# Patient Record
Sex: FEMALE | Race: WHITE | NOT HISPANIC OR LATINO | Employment: OTHER | URBAN - METROPOLITAN AREA
[De-identification: names, ages, dates, MRNs, and addresses within clinical notes are randomized per-mention and may not be internally consistent; named-entity substitution may affect disease eponyms.]

---

## 2023-03-30 LAB
ALANINE AMINOTRANSFERASE (SGPT) (U/L) IN SER/PLAS: 62 U/L (ref 7–45)
ALBUMIN (G/DL) IN SER/PLAS: 4.4 G/DL (ref 3.4–5)
ALKALINE PHOSPHATASE (U/L) IN SER/PLAS: 153 U/L (ref 33–136)
ANION GAP IN SER/PLAS: 12 MMOL/L (ref 10–20)
ASPARTATE AMINOTRANSFERASE (SGOT) (U/L) IN SER/PLAS: 81 U/L (ref 9–39)
BILIRUBIN TOTAL (MG/DL) IN SER/PLAS: 0.7 MG/DL (ref 0–1.2)
CALCIUM (MG/DL) IN SER/PLAS: 9.4 MG/DL (ref 8.6–10.6)
CARBON DIOXIDE, TOTAL (MMOL/L) IN SER/PLAS: 27 MMOL/L (ref 21–32)
CHLORIDE (MMOL/L) IN SER/PLAS: 104 MMOL/L (ref 98–107)
CREATININE (MG/DL) IN SER/PLAS: 0.75 MG/DL (ref 0.5–1.05)
FERRITIN (UG/LL) IN SER/PLAS: 475 UG/L (ref 8–150)
GFR FEMALE: 88 ML/MIN/1.73M2
GLUCOSE (MG/DL) IN SER/PLAS: 92 MG/DL (ref 74–99)
HEPATITIS A VIRUS IGM AB PRESENCE IN SER/PLAS BY IMMUNOASSAY: NONREACTIVE
HEPATITIS B VIRUS CORE IGM AB PRESENCE IN SER/PLAS BY IMMUNOASSY: NONREACTIVE
HEPATITIS B VIRUS SURFACE AG PRESENCE IN SERUM: NONREACTIVE
HEPATITIS C VIRUS AB PRESENCE IN SERUM: NONREACTIVE
POTASSIUM (MMOL/L) IN SER/PLAS: 4.3 MMOL/L (ref 3.5–5.3)
PROTEIN TOTAL: 7.1 G/DL (ref 6.4–8.2)
SODIUM (MMOL/L) IN SER/PLAS: 139 MMOL/L (ref 136–145)
UREA NITROGEN (MG/DL) IN SER/PLAS: 13 MG/DL (ref 6–23)

## 2023-03-31 LAB — ANTI-NUCLEAR ANTIBODY (ANA): NEGATIVE

## 2023-04-03 DIAGNOSIS — R79.89 ABNORMAL LFTS (LIVER FUNCTION TESTS): Primary | ICD-10-CM

## 2023-04-03 LAB
ALKALINE PHOSPHATASE (REF): 171 U/L (ref 40–120)
ALKALINE PHOSPHATASE OTHER: 0 U/L
ALKALINE PHOSPHATASE.BONE (U/L) IN SERUM OR PLASMA: 46 U/L (ref 0–55)
ALKALINE PHOSPHATASE.LIVER (U/L) IN SERUM OR PLASMA: 125 U/L (ref 0–94)

## 2023-04-04 DIAGNOSIS — K21.9 GERD WITHOUT ESOPHAGITIS: Primary | ICD-10-CM

## 2023-04-04 RX ORDER — OMEPRAZOLE 40 MG/1
CAPSULE, DELAYED RELEASE ORAL
Qty: 90 CAPSULE | Refills: 0 | Status: SHIPPED | OUTPATIENT
Start: 2023-04-04 | End: 2023-05-30 | Stop reason: SDUPTHER

## 2023-05-23 DIAGNOSIS — I10 HTN (HYPERTENSION), BENIGN: Primary | ICD-10-CM

## 2023-05-23 RX ORDER — LISINOPRIL 40 MG/1
TABLET ORAL
Qty: 90 TABLET | Refills: 0 | Status: SHIPPED | OUTPATIENT
Start: 2023-05-23 | End: 2023-05-30 | Stop reason: SDUPTHER

## 2023-05-24 LAB
ALANINE AMINOTRANSFERASE (SGPT) (U/L) IN SER/PLAS: 67 U/L (ref 7–45)
ALBUMIN (G/DL) IN SER/PLAS: 3.9 G/DL (ref 3.4–5)
ALBUMIN (G/DL) IN SER/PLAS: 3.9 G/DL (ref 3.4–5)
ALKALINE PHOSPHATASE (U/L) IN SER/PLAS: 136 U/L (ref 33–136)
ANION GAP IN SER/PLAS: 14 MMOL/L (ref 10–20)
ASPARTATE AMINOTRANSFERASE (SGOT) (U/L) IN SER/PLAS: 55 U/L (ref 9–39)
BILIRUBIN DIRECT (MG/DL) IN SER/PLAS: 0.1 MG/DL (ref 0–0.3)
BILIRUBIN TOTAL (MG/DL) IN SER/PLAS: 0.5 MG/DL (ref 0–1.2)
CALCIUM (MG/DL) IN SER/PLAS: 9.2 MG/DL (ref 8.6–10.6)
CARBON DIOXIDE, TOTAL (MMOL/L) IN SER/PLAS: 23 MMOL/L (ref 21–32)
CERULOPLASMIN (MG/DL) IN SER/PLAS: 32 MG/DL (ref 20–60)
CHLORIDE (MMOL/L) IN SER/PLAS: 106 MMOL/L (ref 98–107)
CREATININE (MG/DL) IN SER/PLAS: 0.76 MG/DL (ref 0.5–1.05)
GFR FEMALE: 86 ML/MIN/1.73M2
GLUCOSE (MG/DL) IN SER/PLAS: 92 MG/DL (ref 74–99)
IRON (UG/DL) IN SER/PLAS: 131 UG/DL (ref 35–150)
IRON BINDING CAPACITY (UG/DL) IN SER/PLAS: 333 UG/DL (ref 240–445)
IRON SATURATION (%) IN SER/PLAS: 39 % (ref 25–45)
PHOSPHATE (MG/DL) IN SER/PLAS: 3.2 MG/DL (ref 2.5–4.9)
POTASSIUM (MMOL/L) IN SER/PLAS: 4.3 MMOL/L (ref 3.5–5.3)
PROTEIN TOTAL: 7.1 G/DL (ref 6.4–8.2)
SODIUM (MMOL/L) IN SER/PLAS: 139 MMOL/L (ref 136–145)
UREA NITROGEN (MG/DL) IN SER/PLAS: 18 MG/DL (ref 6–23)

## 2023-05-25 LAB — MITOCHONDRIAL ANTIBODY: NEGATIVE

## 2023-05-26 PROBLEM — K57.92 ACUTE DIVERTICULITIS OF INTESTINE: Status: ACTIVE | Noted: 2023-05-26

## 2023-05-26 PROBLEM — M25.511 CHRONIC RIGHT SHOULDER PAIN: Status: ACTIVE | Noted: 2023-05-26

## 2023-05-26 PROBLEM — L20.89 FLEXURAL ATOPIC DERMATITIS: Status: ACTIVE | Noted: 2023-05-26

## 2023-05-26 PROBLEM — D36.9 ADENOMATOUS POLYPS: Status: ACTIVE | Noted: 2023-05-26

## 2023-05-26 PROBLEM — I10 BENIGN HYPERTENSION: Status: ACTIVE | Noted: 2023-05-26

## 2023-05-26 PROBLEM — Z11.52 ENCOUNTER FOR SCREENING LABORATORY TESTING FOR COVID-19 VIRUS: Status: ACTIVE | Noted: 2023-05-26

## 2023-05-26 PROBLEM — R09.89 RESPIRATORY SYMPTOMS: Status: ACTIVE | Noted: 2023-05-26

## 2023-05-26 PROBLEM — K76.0 STEATOSIS OF LIVER: Status: ACTIVE | Noted: 2023-05-26

## 2023-05-26 PROBLEM — M25.611 DECREASED RANGE OF MOTION OF RIGHT SHOULDER: Status: ACTIVE | Noted: 2023-05-26

## 2023-05-26 PROBLEM — J02.9 SORE THROAT: Status: ACTIVE | Noted: 2023-05-26

## 2023-05-26 PROBLEM — E66.9 OBESITY (BMI 30-39.9): Status: ACTIVE | Noted: 2023-05-26

## 2023-05-26 PROBLEM — J06.9 VIRAL URI: Status: ACTIVE | Noted: 2023-05-26

## 2023-05-26 PROBLEM — I51.9 DIASTOLIC DYSFUNCTION, LEFT VENTRICLE: Status: ACTIVE | Noted: 2023-05-26

## 2023-05-26 PROBLEM — K83.1 STRICTURE OF BILE DUCT (CMS-HCC): Status: ACTIVE | Noted: 2023-05-26

## 2023-05-26 PROBLEM — K57.32 DIVERTICULITIS OF COLON: Status: ACTIVE | Noted: 2023-05-26

## 2023-05-26 PROBLEM — J01.90 ACUTE SINUSITIS: Status: ACTIVE | Noted: 2023-05-26

## 2023-05-26 PROBLEM — D12.6 SERRATED ADENOMA OF COLON: Status: ACTIVE | Noted: 2023-05-26

## 2023-05-26 PROBLEM — R10.9 ACUTE ABDOMINAL PAIN: Status: ACTIVE | Noted: 2023-05-26

## 2023-05-26 PROBLEM — K21.9 GERD (GASTROESOPHAGEAL REFLUX DISEASE): Status: ACTIVE | Noted: 2023-05-26

## 2023-05-26 PROBLEM — L40.9 PSORIASIS: Status: ACTIVE | Noted: 2023-05-26

## 2023-05-26 PROBLEM — R79.89 ELEVATED LFTS: Status: ACTIVE | Noted: 2023-05-26

## 2023-05-26 PROBLEM — G89.29 CHRONIC RIGHT SHOULDER PAIN: Status: ACTIVE | Noted: 2023-05-26

## 2023-05-30 ENCOUNTER — OFFICE VISIT (OUTPATIENT)
Dept: PRIMARY CARE | Facility: CLINIC | Age: 68
End: 2023-05-30
Payer: MEDICARE

## 2023-05-30 VITALS
BODY MASS INDEX: 32.27 KG/M2 | SYSTOLIC BLOOD PRESSURE: 118 MMHG | HEIGHT: 64 IN | HEART RATE: 90 BPM | TEMPERATURE: 97.9 F | RESPIRATION RATE: 16 BRPM | OXYGEN SATURATION: 96 % | WEIGHT: 189 LBS | DIASTOLIC BLOOD PRESSURE: 82 MMHG

## 2023-05-30 DIAGNOSIS — E66.09 CLASS 1 OBESITY DUE TO EXCESS CALORIES WITHOUT SERIOUS COMORBIDITY WITH BODY MASS INDEX (BMI) OF 32.0 TO 32.9 IN ADULT: ICD-10-CM

## 2023-05-30 DIAGNOSIS — K21.9 GASTROESOPHAGEAL REFLUX DISEASE WITHOUT ESOPHAGITIS: ICD-10-CM

## 2023-05-30 DIAGNOSIS — K21.9 GERD WITHOUT ESOPHAGITIS: ICD-10-CM

## 2023-05-30 DIAGNOSIS — I10 BENIGN HYPERTENSION: Primary | ICD-10-CM

## 2023-05-30 DIAGNOSIS — R79.89 ELEVATED LFTS: ICD-10-CM

## 2023-05-30 DIAGNOSIS — I10 HTN (HYPERTENSION), BENIGN: ICD-10-CM

## 2023-05-30 PROBLEM — J06.9 VIRAL URI: Status: RESOLVED | Noted: 2023-05-26 | Resolved: 2023-05-30

## 2023-05-30 PROBLEM — E66.811 CLASS 1 OBESITY DUE TO EXCESS CALORIES WITHOUT SERIOUS COMORBIDITY WITH BODY MASS INDEX (BMI) OF 32.0 TO 32.9 IN ADULT: Status: ACTIVE | Noted: 2023-05-30

## 2023-05-30 PROBLEM — J02.9 SORE THROAT: Status: RESOLVED | Noted: 2023-05-26 | Resolved: 2023-05-30

## 2023-05-30 PROBLEM — R10.9 ACUTE ABDOMINAL PAIN: Status: RESOLVED | Noted: 2023-05-26 | Resolved: 2023-05-30

## 2023-05-30 PROBLEM — Z11.52 ENCOUNTER FOR SCREENING LABORATORY TESTING FOR COVID-19 VIRUS: Status: RESOLVED | Noted: 2023-05-26 | Resolved: 2023-05-30

## 2023-05-30 PROCEDURE — 1159F MED LIST DOCD IN RCRD: CPT | Performed by: FAMILY MEDICINE

## 2023-05-30 PROCEDURE — 3008F BODY MASS INDEX DOCD: CPT | Performed by: FAMILY MEDICINE

## 2023-05-30 PROCEDURE — 3074F SYST BP LT 130 MM HG: CPT | Performed by: FAMILY MEDICINE

## 2023-05-30 PROCEDURE — 3079F DIAST BP 80-89 MM HG: CPT | Performed by: FAMILY MEDICINE

## 2023-05-30 PROCEDURE — 1036F TOBACCO NON-USER: CPT | Performed by: FAMILY MEDICINE

## 2023-05-30 PROCEDURE — 99213 OFFICE O/P EST LOW 20 MIN: CPT | Performed by: FAMILY MEDICINE

## 2023-05-30 RX ORDER — LISINOPRIL 40 MG/1
40 TABLET ORAL DAILY
Qty: 90 TABLET | Refills: 1 | Status: SHIPPED | OUTPATIENT
Start: 2023-05-30 | End: 2023-12-04 | Stop reason: SINTOL

## 2023-05-30 RX ORDER — OMEPRAZOLE 40 MG/1
40 CAPSULE, DELAYED RELEASE ORAL DAILY
Qty: 90 CAPSULE | Refills: 1 | Status: SHIPPED | OUTPATIENT
Start: 2023-05-30 | End: 2023-05-30 | Stop reason: SDUPTHER

## 2023-05-30 RX ORDER — LISINOPRIL 40 MG/1
40 TABLET ORAL DAILY
Qty: 90 TABLET | Refills: 1 | Status: SHIPPED | OUTPATIENT
Start: 2023-05-30 | End: 2023-05-30 | Stop reason: SDUPTHER

## 2023-05-30 RX ORDER — OMEPRAZOLE 40 MG/1
40 CAPSULE, DELAYED RELEASE ORAL DAILY
Qty: 90 CAPSULE | Refills: 1 | Status: SHIPPED | OUTPATIENT
Start: 2023-05-30 | End: 2023-12-04 | Stop reason: SDUPTHER

## 2023-05-30 ASSESSMENT — ENCOUNTER SYMPTOMS
LOSS OF SENSATION IN FEET: 0
FATIGUE: 0
MYALGIAS: 0
ABDOMINAL DISTENTION: 0
DYSURIA: 0
OCCASIONAL FEELINGS OF UNSTEADINESS: 0
SINUS PRESSURE: 0
ARTHRALGIAS: 0
CHILLS: 0
HEADACHES: 0
CHEST TIGHTNESS: 0
DIFFICULTY URINATING: 0
ABDOMINAL PAIN: 0
NERVOUS/ANXIOUS: 0
BRUISES/BLEEDS EASILY: 0
SLEEP DISTURBANCE: 0
ADENOPATHY: 0
APPETITE CHANGE: 0
SHORTNESS OF BREATH: 0
DIARRHEA: 0
NAUSEA: 0
DEPRESSION: 0
WHEEZING: 0
FEVER: 0
LIGHT-HEADEDNESS: 0
DYSPHORIC MOOD: 0

## 2023-05-30 NOTE — PROGRESS NOTES
"Subjective   Patient ID: Keri Tolliver is a 67 y.o. female who presents for Follow-up.    HPI     Review of Systems    Objective   /82   Pulse 90   Temp 36.6 °C (97.9 °F)   Resp 16   Ht 1.626 m (5' 4\")   Wt 85.7 kg (189 lb)   SpO2 96%   BMI 32.44 kg/m²     Physical Exam    Assessment/Plan          "

## 2023-05-30 NOTE — PROGRESS NOTES
"Subjective   Reason for Visit: Keri Tolliver is an 67 y.o. female here follow up visit.    Past Medical, Surgical, and Family History reviewed and updated in chart.    Reviewed all medications by prescribing practitioner or clinical pharmacist (such as prescriptions, OTCs, herbal therapies and supplements) and documented in the medical record.    Pt has chronic HTN.  Pt is taking Lisinopril. Tolerating well.  Exercising 3-4 days per week   Low sodium diet is usually being followed.   Is not monitoring home blood pressures.  Denies HA, vision changes or CP.     GERD is well controlled on Omeprazole . Pt is taking medication daily. Denies epigastric pain, nausea, heartburn or water brash. Seeing Dr Mensah for elevated LFTs and Alk phos. Has MRI liver planned.         Patient Care Team:  Tj Ospina MD as PCP - General  Tj O[facility]ina [facility] P[facility]     Review of Systems   Constitutional:  Negative for appetite change, chills, fatigue and fever.   HENT:  Negative for congestion, ear pain and sinus pressure.    Eyes:  Negative for visual disturbance.   Respiratory:  Negative for chest tightness, shortness of breath and wheezing.    Cardiovascular:  Negative for chest pain.   Gastrointestinal:  Negative for abdominal distention, abdominal pain, diarrhea and nausea.   Genitourinary:  Negative for difficulty urinating, dysuria and pelvic pain.   Musculoskeletal:  Negative for arthralgias and myalgias.   Skin:  Negative for rash.   Allergic/Immunologic: Negative for immunocompromised state.   Neurological:  Negative for light-headedness and headaches.   Hematological:  Negative for adenopathy. Does not bruise/bleed easily.   Psychiatric/Behavioral:  Negative for dysphoric mood and sleep disturbance. The patient is not nervous/anxious.        Objective   Vitals:  /82   Pulse 90   Temp 36.6 °C (97.9 °F)   Resp 16   Ht 1.626 m (5' 4\")   Wt 85.7 kg (189 lb)   SpO2 96%   " BMI 32.44 kg/m²       Physical Exam  Constitutional:       General: She is not in acute distress.     Appearance: Normal appearance.   Cardiovascular:      Rate and Rhythm: Normal rate and regular rhythm.      Heart sounds: Normal heart sounds. No murmur heard.  Pulmonary:      Effort: Pulmonary effort is normal.      Breath sounds: Normal breath sounds.   Abdominal:      Palpations: Abdomen is soft.      Tenderness: There is no abdominal tenderness.   Neurological:      Mental Status: She is alert.   Psychiatric:         Mood and Affect: Mood normal.         Judgment: Judgment normal.         Assessment/Plan   Problem List Items Addressed This Visit    None    High BP - well controlled, continue current medication     Weight - recommend low carb diet, increasing water intake to at least 64oz/day, healthy snacking between meals, and regular cardiovascular exercise 150mins/week. Goal for weight loss is 1-2# per week.     Elevated LFTs - keep plan for MRI and fu with Dr Mensah.    Follow up in 6 months, 30min for physical

## 2023-08-21 ENCOUNTER — TELEPHONE (OUTPATIENT)
Dept: PRIMARY CARE | Facility: CLINIC | Age: 68
End: 2023-08-21
Payer: MEDICARE

## 2023-08-21 LAB
ALANINE AMINOTRANSFERASE (SGPT) (U/L) IN SER/PLAS: 54 U/L (ref 7–45)
ALBUMIN (G/DL) IN SER/PLAS: 3.8 G/DL (ref 3.4–5)
ALKALINE PHOSPHATASE (U/L) IN SER/PLAS: 162 U/L (ref 33–136)
ANION GAP IN SER/PLAS: 16 MMOL/L (ref 10–20)
ASPARTATE AMINOTRANSFERASE (SGOT) (U/L) IN SER/PLAS: 53 U/L (ref 9–39)
BASOPHILS (10*3/UL) IN BLOOD BY AUTOMATED COUNT: 0.03 X10E9/L (ref 0–0.1)
BASOPHILS/100 LEUKOCYTES IN BLOOD BY AUTOMATED COUNT: 0.3 % (ref 0–2)
BILIRUBIN TOTAL (MG/DL) IN SER/PLAS: 0.7 MG/DL (ref 0–1.2)
CALCIUM (MG/DL) IN SER/PLAS: 9.4 MG/DL (ref 8.6–10.6)
CARBON DIOXIDE, TOTAL (MMOL/L) IN SER/PLAS: 25 MMOL/L (ref 21–32)
CHLORIDE (MMOL/L) IN SER/PLAS: 102 MMOL/L (ref 98–107)
CREATININE (MG/DL) IN SER/PLAS: 0.78 MG/DL (ref 0.5–1.05)
EOSINOPHILS (10*3/UL) IN BLOOD BY AUTOMATED COUNT: 0.23 X10E9/L (ref 0–0.7)
EOSINOPHILS/100 LEUKOCYTES IN BLOOD BY AUTOMATED COUNT: 2.6 % (ref 0–6)
ERYTHROCYTE DISTRIBUTION WIDTH (RATIO) BY AUTOMATED COUNT: 12.2 % (ref 11.5–14.5)
ERYTHROCYTE MEAN CORPUSCULAR HEMOGLOBIN CONCENTRATION (G/DL) BY AUTOMATED: 33.3 G/DL (ref 32–36)
ERYTHROCYTE MEAN CORPUSCULAR VOLUME (FL) BY AUTOMATED COUNT: 100 FL (ref 80–100)
ERYTHROCYTES (10*6/UL) IN BLOOD BY AUTOMATED COUNT: 4.22 X10E12/L (ref 4–5.2)
GFR FEMALE: 83 ML/MIN/1.73M2
GLUCOSE (MG/DL) IN SER/PLAS: 98 MG/DL (ref 74–99)
HEMATOCRIT (%) IN BLOOD BY AUTOMATED COUNT: 42.4 % (ref 36–46)
HEMOGLOBIN (G/DL) IN BLOOD: 14.1 G/DL (ref 12–16)
IMMATURE GRANULOCYTES/100 LEUKOCYTES IN BLOOD BY AUTOMATED COUNT: 0.3 % (ref 0–0.9)
LEUKOCYTES (10*3/UL) IN BLOOD BY AUTOMATED COUNT: 9 X10E9/L (ref 4.4–11.3)
LYMPHOCYTES (10*3/UL) IN BLOOD BY AUTOMATED COUNT: 1.97 X10E9/L (ref 1.2–4.8)
LYMPHOCYTES/100 LEUKOCYTES IN BLOOD BY AUTOMATED COUNT: 21.9 % (ref 13–44)
MONOCYTES (10*3/UL) IN BLOOD BY AUTOMATED COUNT: 0.81 X10E9/L (ref 0.1–1)
MONOCYTES/100 LEUKOCYTES IN BLOOD BY AUTOMATED COUNT: 9 % (ref 2–10)
NEUTROPHILS (10*3/UL) IN BLOOD BY AUTOMATED COUNT: 5.94 X10E9/L (ref 1.2–7.7)
NEUTROPHILS/100 LEUKOCYTES IN BLOOD BY AUTOMATED COUNT: 65.9 % (ref 40–80)
NRBC (PER 100 WBCS) BY AUTOMATED COUNT: 0 /100 WBC (ref 0–0)
PLATELETS (10*3/UL) IN BLOOD AUTOMATED COUNT: 198 X10E9/L (ref 150–450)
POTASSIUM (MMOL/L) IN SER/PLAS: 4.2 MMOL/L (ref 3.5–5.3)
PROTEIN TOTAL: 7.2 G/DL (ref 6.4–8.2)
SODIUM (MMOL/L) IN SER/PLAS: 139 MMOL/L (ref 136–145)
UREA NITROGEN (MG/DL) IN SER/PLAS: 10 MG/DL (ref 6–23)

## 2023-08-21 NOTE — TELEPHONE ENCOUNTER
Pt c/o LLQ pain w/back pain. Went to urgent care 2 weeks ago for PNA. Finished ATB last tuesday. Diarrhea yesterday. Loose stools today. Denies fever, N/V. Pt believes she has the beginning of diverticulitis.

## 2023-08-22 LAB
C. DIFFICILE TOXIN, PCR: NOT DETECTED
CAMPYLOBACTER GP: NOT DETECTED
NOROVIRUS GI/GII: NOT DETECTED
ROTAVIRUS A: NOT DETECTED
SALMONELLA SP.: NOT DETECTED
SHIGA TOXIN 1: NOT DETECTED
SHIGA TOXIN 2: NOT DETECTED
SHIGELLA SP.: NOT DETECTED
STOOL CULTURE, TEST OF CURE: NORMAL
VIBRIO GRP.: NOT DETECTED
YERSINIA ENTEROCOLITICA: NOT DETECTED

## 2023-08-30 LAB
CRYPTOSPORIDIUM ANTIGEN-DATA CONVERSION: NEGATIVE
GIARDIA LAMBLIA AG-DATA CONVERSION: NEGATIVE
OVA + PARASITE EXAM: NEGATIVE

## 2023-11-03 ENCOUNTER — PATIENT OUTREACH (OUTPATIENT)
Dept: CARE COORDINATION | Facility: CLINIC | Age: 68
End: 2023-11-03
Payer: MEDICARE

## 2023-11-03 ENCOUNTER — DOCUMENTATION (OUTPATIENT)
Dept: CARE COORDINATION | Facility: CLINIC | Age: 68
End: 2023-11-03
Payer: MEDICARE

## 2023-11-03 RX ORDER — AMOXICILLIN AND CLAVULANATE POTASSIUM 875; 125 MG/1; MG/1
875 TABLET, FILM COATED ORAL 2 TIMES DAILY
COMMUNITY
End: 2023-11-10 | Stop reason: WASHOUT

## 2023-11-03 NOTE — PROGRESS NOTES
Discharge Facility:[facility]  Discharge Diagnosis:WAN Dehrdration  Admission Date:10/30/23  Discharge Date: 11/02/23    PCP Appointment Date:11/10/23  Specialist Appointment Date:   Hospital Encounter and Summary: Linked   See discharge assessment below for further details  Engagement  Call Start Time: 0102 (11/3/2023  1:02 PM)    Medications  Medications reviewed with patient/caregiver?: Yes (new augmentin clav 875/125) (11/3/2023  1:02 PM)  Is the patient having any side effects they believe may be caused by any medication additions or changes?: No (11/3/2023  1:02 PM)  Does the patient have all medications ordered at discharge?: Yes (11/3/2023  1:02 PM)  Is the patient taking all medications as directed (includes completed medication regime)?: Yes (11/3/2023  1:02 PM)    Appointments  Does the patient have a primary care provider?: Yes (11/3/2023  1:02 PM)  Care Management Interventions: Verified appointment date/time/provider (11/3/2023  1:02 PM)  Care Management Interventions: Advised patient to keep appointment (11/3/2023  1:02 PM)    Self Management  Has home health visited the patient within 72 hours of discharge?: Not applicable (11/3/2023  1:02 PM)  Has all Durable Medical Equipment (DME) been delivered?: No (11/3/2023  1:02 PM)    Patient Teaching  Does the patient have access to their discharge instructions?: Yes (11/3/2023  1:02 PM)  Care Management Interventions: Reviewed instructions with patient (11/3/2023  1:02 PM)  What is the patient's perception of their health status since discharge?: Improving (11/3/2023  1:02 PM)    Wrap Up  Call End Time: 0110 (11/3/2023  1:02 PM)

## 2023-11-10 ENCOUNTER — OFFICE VISIT (OUTPATIENT)
Dept: PRIMARY CARE | Facility: CLINIC | Age: 68
End: 2023-11-10
Payer: MEDICARE

## 2023-11-10 VITALS
RESPIRATION RATE: 12 BRPM | OXYGEN SATURATION: 97 % | BODY MASS INDEX: 31.24 KG/M2 | HEART RATE: 88 BPM | HEIGHT: 64 IN | SYSTOLIC BLOOD PRESSURE: 132 MMHG | WEIGHT: 183 LBS | DIASTOLIC BLOOD PRESSURE: 84 MMHG

## 2023-11-10 DIAGNOSIS — I10 BENIGN HYPERTENSION: ICD-10-CM

## 2023-11-10 DIAGNOSIS — R79.89 ELEVATED LFTS: ICD-10-CM

## 2023-11-10 DIAGNOSIS — R05.9 COUGH IN ADULT: ICD-10-CM

## 2023-11-10 DIAGNOSIS — R09.82 POST-NASAL DRIP: ICD-10-CM

## 2023-11-10 DIAGNOSIS — N28.9 ACUTE RENAL INSUFFICIENCY: ICD-10-CM

## 2023-11-10 DIAGNOSIS — K57.92 ACUTE DIVERTICULITIS OF INTESTINE: Primary | ICD-10-CM

## 2023-11-10 PROCEDURE — 3079F DIAST BP 80-89 MM HG: CPT | Performed by: FAMILY MEDICINE

## 2023-11-10 PROCEDURE — 1125F AMNT PAIN NOTED PAIN PRSNT: CPT | Performed by: FAMILY MEDICINE

## 2023-11-10 PROCEDURE — 99495 TRANSJ CARE MGMT MOD F2F 14D: CPT | Performed by: FAMILY MEDICINE

## 2023-11-10 PROCEDURE — 1036F TOBACCO NON-USER: CPT | Performed by: FAMILY MEDICINE

## 2023-11-10 PROCEDURE — 1159F MED LIST DOCD IN RCRD: CPT | Performed by: FAMILY MEDICINE

## 2023-11-10 PROCEDURE — 3075F SYST BP GE 130 - 139MM HG: CPT | Performed by: FAMILY MEDICINE

## 2023-11-10 PROCEDURE — 3008F BODY MASS INDEX DOCD: CPT | Performed by: FAMILY MEDICINE

## 2023-11-10 RX ORDER — AMLODIPINE BESYLATE 10 MG/1
10 TABLET ORAL DAILY
Qty: 90 TABLET | Refills: 0 | Status: SHIPPED | OUTPATIENT
Start: 2023-11-10 | End: 2023-12-04 | Stop reason: SDUPTHER

## 2023-11-10 RX ORDER — BENZONATATE 200 MG/1
200 CAPSULE ORAL 3 TIMES DAILY PRN
Qty: 30 CAPSULE | Refills: 1 | Status: SHIPPED | OUTPATIENT
Start: 2023-11-10 | End: 2023-12-10

## 2023-11-10 ASSESSMENT — ENCOUNTER SYMPTOMS
CHILLS: 0
WHEEZING: 0
ADENOPATHY: 0
MYALGIAS: 0
APPETITE CHANGE: 0
ARTHRALGIAS: 0
LIGHT-HEADEDNESS: 0
ABDOMINAL DISTENTION: 0
DIFFICULTY URINATING: 0
HEADACHES: 0
FEVER: 0
DYSPHORIC MOOD: 0
SHORTNESS OF BREATH: 0
NERVOUS/ANXIOUS: 0
DYSURIA: 0
SLEEP DISTURBANCE: 0
NAUSEA: 0
ABDOMINAL PAIN: 0
COUGH: 1
DIARRHEA: 0
BRUISES/BLEEDS EASILY: 0
FATIGUE: 0
CHEST TIGHTNESS: 0
SINUS PRESSURE: 0

## 2023-11-10 NOTE — PROGRESS NOTES
Subjective   Patient ID: Keri Tolliver is a 68 y.o. female who presents for Hospital Follow-up.  Pt here for fu from [facility] 10/30-11/2 .  She was having abdominal frequent watery diarrhea poor appetite. Denies vomiting. Diarrhea was nonbloody. Onset was 10/27, went to UC initially then to ER on 10/30. Pt reports symptoms are improving. Having loose Bms improving gradually. She was tx with Unasyn inpt and d/c'd on Augmentin, finished.     She also brings up dry cough, occasionally brings up phlegm since July following PNA. CXR normal at ER. She is taking Lisinopril for HBP but it is not new or changed. She has PND and is starting on Flonase.             Discharge Facility:[facility]  Discharge Diagnosis:WAN Dehrdration  Admission Date:10/30/23  Discharge Date: 11/02/23    PCP Appointment Date:11/10/23  Specialist Appointment Date:   Hospital Encounter and Summary: Linked   See discharge assessment below for further details  Engagement  Call Start Time: 0102 (11/3/2023  1:02 PM)    Medications  Medications reviewed with patient/caregiver?: Yes (new augmentin clav 875/125) (11/3/2023  1:02 PM)  Is the patient having any side effects they believe may be caused by any medication additions or changes?: No (11/3/2023  1:02 PM)  Does the patient have all medications ordered at discharge?: Yes (11/3/2023  1:02 PM)  Is the patient taking all medications as directed (includes completed medication regime)?: Yes (11/3/2023  1:02 PM)    Appointments  Does the patient have a primary care provider?: Yes (11/3/2023  1:02 PM)  Care Management Interventions: Verified appointment date/time/provider (11/3/2023  1:02 PM)  Care Management Interventions: Advised patient to keep appointment (11/3/2023  1:02 PM)    Self Management  Has home health visited the patient within 72 hours of discharge?: Not applicable (11/3/2023  1:02 PM)  Has all Durable Medical Equipment (DME) been delivered?: No (11/3/2023  1:02 PM)    Patient  "Teaching  Does the patient have access to their discharge instructions?: Yes (11/3/2023  1:02 PM)  Care Management Interventions: Reviewed instructions with patient (11/3/2023  1:02 PM)  What is the patient's perception of their health status since discharge?: Improving (11/3/2023  1:02 PM)    Wrap Up  Call End Time: 0110 (11/3/2023  1:02 PM)        Review of Systems   Constitutional:  Negative for appetite change, chills, fatigue and fever.   HENT:  Positive for congestion and postnasal drip. Negative for ear pain and sinus pressure.    Eyes:  Negative for visual disturbance.   Respiratory:  Positive for cough. Negative for chest tightness, shortness of breath and wheezing.    Cardiovascular:  Negative for chest pain.   Gastrointestinal:  Negative for abdominal distention, abdominal pain, diarrhea and nausea.   Genitourinary:  Negative for difficulty urinating, dysuria and pelvic pain.   Musculoskeletal:  Negative for arthralgias and myalgias.   Skin:  Negative for rash.   Allergic/Immunologic: Negative for immunocompromised state.   Neurological:  Negative for light-headedness and headaches.   Hematological:  Negative for adenopathy. Does not bruise/bleed easily.   Psychiatric/Behavioral:  Negative for dysphoric mood and sleep disturbance. The patient is not nervous/anxious.        Objective   /84   Pulse 88   Resp 12   Ht 1.626 m (5' 4\")   Wt 83 kg (183 lb)   SpO2 97%   BMI 31.41 kg/m²    Physical Exam  Constitutional:       General: She is not in acute distress.     Appearance: Normal appearance.   Cardiovascular:      Rate and Rhythm: Normal rate and regular rhythm.      Heart sounds: Normal heart sounds. No murmur heard.  Pulmonary:      Effort: Pulmonary effort is normal.      Breath sounds: Rhonchi (upper airway) present. No wheezing or rales.   Abdominal:      Palpations: Abdomen is soft.      Tenderness: There is no abdominal tenderness.   Neurological:      Mental Status: She is alert. "   Psychiatric:         Mood and Affect: Mood normal.         Judgment: Judgment normal.           Assessment/Plan   Diagnoses and all orders for this visit:  Acute diverticulitis of intestine/elevated LFTs - recheck labs in 1 week. Recommend scheduling follow up visit with DR Mensah within the next 4-6weeks.  Acute renal insufficiency - improved before discharge, continue good hydration  Cough in adult/Post-nasal drip - continue Flonase, start nasal saline (Ie. Sinus Rinse) and may try Allegra. Discontinue Lisinopril and start Amlodipine once daily for BP. Monitor BP 3x weekly and bring readings to next visit    Follow up in December as planned

## 2023-11-10 NOTE — PROGRESS NOTES
"Subjective   Patient ID: Keri Tolliver is a 68 y.o. female who presents for Hospital Follow-up.    HPI     Review of Systems    Objective   /84   Pulse 88   Resp 12   Ht 1.626 m (5' 4\")   Wt 83 kg (183 lb)   SpO2 97%   BMI 31.41 kg/m²     Physical Exam    Assessment/Plan          "

## 2023-11-15 ENCOUNTER — PATIENT OUTREACH (OUTPATIENT)
Dept: CARE COORDINATION | Facility: CLINIC | Age: 68
End: 2023-11-15
Payer: MEDICARE

## 2023-11-15 NOTE — PROGRESS NOTES
Unable to reach patient for call back after patient's follow up appointment with PCP.   PHOENIXM with call back number for patient to call if needed   If no voicemail available call attempts x 2 were made to contact the patient to assist with any questions or concerns patient may have.

## 2023-11-20 ENCOUNTER — APPOINTMENT (OUTPATIENT)
Dept: PRIMARY CARE | Facility: CLINIC | Age: 68
End: 2023-11-20
Payer: MEDICARE

## 2023-11-30 ENCOUNTER — LAB (OUTPATIENT)
Dept: LAB | Facility: LAB | Age: 68
End: 2023-11-30
Payer: MEDICARE

## 2023-11-30 DIAGNOSIS — R79.89 ELEVATED LFTS: ICD-10-CM

## 2023-11-30 LAB
ALBUMIN SERPL BCP-MCNC: 4.5 G/DL (ref 3.4–5)
ALP SERPL-CCNC: 237 U/L (ref 33–136)
ALT SERPL W P-5'-P-CCNC: 76 U/L (ref 7–45)
ANION GAP SERPL CALC-SCNC: 17 MMOL/L (ref 10–20)
AST SERPL W P-5'-P-CCNC: 84 U/L (ref 9–39)
BILIRUB SERPL-MCNC: 0.7 MG/DL (ref 0–1.2)
BUN SERPL-MCNC: 10 MG/DL (ref 6–23)
CALCIUM SERPL-MCNC: 9.4 MG/DL (ref 8.6–10.6)
CHLORIDE SERPL-SCNC: 104 MMOL/L (ref 98–107)
CO2 SERPL-SCNC: 21 MMOL/L (ref 21–32)
CREAT SERPL-MCNC: 0.88 MG/DL (ref 0.5–1.05)
GFR SERPL CREATININE-BSD FRML MDRD: 72 ML/MIN/1.73M*2
GLUCOSE SERPL-MCNC: 115 MG/DL (ref 74–99)
POTASSIUM SERPL-SCNC: 3.9 MMOL/L (ref 3.5–5.3)
PROT SERPL-MCNC: 7.1 G/DL (ref 6.4–8.2)
SODIUM SERPL-SCNC: 138 MMOL/L (ref 136–145)

## 2023-11-30 PROCEDURE — 36415 COLL VENOUS BLD VENIPUNCTURE: CPT

## 2023-11-30 PROCEDURE — 80053 COMPREHEN METABOLIC PANEL: CPT

## 2023-12-01 ENCOUNTER — PATIENT OUTREACH (OUTPATIENT)
Dept: CARE COORDINATION | Facility: CLINIC | Age: 68
End: 2023-12-01
Payer: MEDICARE

## 2023-12-01 ENCOUNTER — TELEPHONE (OUTPATIENT)
Dept: PRIMARY CARE | Facility: CLINIC | Age: 68
End: 2023-12-01
Payer: MEDICARE

## 2023-12-01 NOTE — TELEPHONE ENCOUNTER
----- Message from Tj Ospina MD sent at 12/1/2023  6:53 AM EST -----  AST and Alkaline phosphatase (liver function)levels are further elevated, this should be further addressed with her GI. Glucose elevated in prediabetes range if labs were done fasting.   ----- Message -----  From: Lab, Background User  Sent: 11/30/2023  10:32 PM EST  To: Tj Ospina MD

## 2023-12-04 ENCOUNTER — OFFICE VISIT (OUTPATIENT)
Dept: PRIMARY CARE | Facility: CLINIC | Age: 68
End: 2023-12-04
Payer: MEDICARE

## 2023-12-04 VITALS
RESPIRATION RATE: 12 BRPM | HEIGHT: 64 IN | HEART RATE: 92 BPM | DIASTOLIC BLOOD PRESSURE: 76 MMHG | OXYGEN SATURATION: 99 % | SYSTOLIC BLOOD PRESSURE: 122 MMHG | WEIGHT: 177 LBS | BODY MASS INDEX: 30.22 KG/M2

## 2023-12-04 DIAGNOSIS — Z00.00 HEALTHCARE MAINTENANCE: Primary | ICD-10-CM

## 2023-12-04 DIAGNOSIS — R05.3 CHRONIC COUGH: ICD-10-CM

## 2023-12-04 DIAGNOSIS — K21.9 GERD WITHOUT ESOPHAGITIS: ICD-10-CM

## 2023-12-04 DIAGNOSIS — I10 BENIGN HYPERTENSION: ICD-10-CM

## 2023-12-04 PROCEDURE — 3008F BODY MASS INDEX DOCD: CPT | Performed by: FAMILY MEDICINE

## 2023-12-04 PROCEDURE — 90677 PCV20 VACCINE IM: CPT | Performed by: FAMILY MEDICINE

## 2023-12-04 PROCEDURE — 3074F SYST BP LT 130 MM HG: CPT | Performed by: FAMILY MEDICINE

## 2023-12-04 PROCEDURE — G0008 ADMIN INFLUENZA VIRUS VAC: HCPCS | Performed by: FAMILY MEDICINE

## 2023-12-04 PROCEDURE — G0439 PPPS, SUBSEQ VISIT: HCPCS | Performed by: FAMILY MEDICINE

## 2023-12-04 PROCEDURE — 1160F RVW MEDS BY RX/DR IN RCRD: CPT | Performed by: FAMILY MEDICINE

## 2023-12-04 PROCEDURE — 1125F AMNT PAIN NOTED PAIN PRSNT: CPT | Performed by: FAMILY MEDICINE

## 2023-12-04 PROCEDURE — G0009 ADMIN PNEUMOCOCCAL VACCINE: HCPCS | Performed by: FAMILY MEDICINE

## 2023-12-04 PROCEDURE — 90662 IIV NO PRSV INCREASED AG IM: CPT | Performed by: FAMILY MEDICINE

## 2023-12-04 PROCEDURE — 3078F DIAST BP <80 MM HG: CPT | Performed by: FAMILY MEDICINE

## 2023-12-04 PROCEDURE — 1159F MED LIST DOCD IN RCRD: CPT | Performed by: FAMILY MEDICINE

## 2023-12-04 PROCEDURE — 1170F FXNL STATUS ASSESSED: CPT | Performed by: FAMILY MEDICINE

## 2023-12-04 PROCEDURE — 1036F TOBACCO NON-USER: CPT | Performed by: FAMILY MEDICINE

## 2023-12-04 RX ORDER — AMLODIPINE BESYLATE 10 MG/1
10 TABLET ORAL DAILY
Qty: 90 TABLET | Refills: 1 | Status: SHIPPED | OUTPATIENT
Start: 2023-12-04 | End: 2024-06-03 | Stop reason: SDUPTHER

## 2023-12-04 RX ORDER — OMEPRAZOLE 40 MG/1
40 CAPSULE, DELAYED RELEASE ORAL DAILY
Qty: 90 CAPSULE | Refills: 1 | Status: SHIPPED | OUTPATIENT
Start: 2023-12-04 | End: 2024-06-03 | Stop reason: SDUPTHER

## 2023-12-04 ASSESSMENT — ENCOUNTER SYMPTOMS
SLEEP DISTURBANCE: 0
CHEST TIGHTNESS: 0
FEVER: 0
DEPRESSION: 0
CHILLS: 0
ADENOPATHY: 0
ABDOMINAL PAIN: 0
SINUS PRESSURE: 0
ABDOMINAL DISTENTION: 0
LOSS OF SENSATION IN FEET: 0
DIARRHEA: 0
DYSPHORIC MOOD: 0
DYSURIA: 0
NERVOUS/ANXIOUS: 0
HEADACHES: 0
MYALGIAS: 0
NAUSEA: 0
SHORTNESS OF BREATH: 0
FATIGUE: 0
DIFFICULTY URINATING: 0
LIGHT-HEADEDNESS: 0
APPETITE CHANGE: 0
BRUISES/BLEEDS EASILY: 0
ARTHRALGIAS: 0
OCCASIONAL FEELINGS OF UNSTEADINESS: 0
WHEEZING: 0

## 2023-12-04 ASSESSMENT — ACTIVITIES OF DAILY LIVING (ADL)
MANAGING_FINANCES: INDEPENDENT
BATHING: INDEPENDENT
DRESSING: INDEPENDENT
GROCERY_SHOPPING: INDEPENDENT
DOING_HOUSEWORK: INDEPENDENT
TAKING_MEDICATION: INDEPENDENT

## 2023-12-04 ASSESSMENT — PATIENT HEALTH QUESTIONNAIRE - PHQ9
SUM OF ALL RESPONSES TO PHQ9 QUESTIONS 1 AND 2: 0
2. FEELING DOWN, DEPRESSED OR HOPELESS: NOT AT ALL
1. LITTLE INTEREST OR PLEASURE IN DOING THINGS: NOT AT ALL

## 2023-12-04 NOTE — PROGRESS NOTES
Subjective   Patient ID: Keri Tolliver is a 68 y.o. female who presents for Medicare Annual Wellness Visit Subsequent.  PMHX, PSHx, Fam hx, and Social hx reviewed.   New concerns  - She has chronic productive cough. 3 courses of antibiotics and steroids have not helped. Also changed off Lisinopril last month and hasn't helped. BP is good on Amlodipine. Labs showed higher LFTS/Alk Phos. She has seen Dr Mensah for this in the past. Recent bout of diverticulitis has resolved.  Vaccines Flu and Prenvar shots due today  Dentist seen at least yearly yes  Vision concerns yes, trouble seeing at night  Hearing concerns no  Diet is usually overall healthy.   Smoker - quit smoking 2008  Alcohol use - 4-5 drinks per week  Exercising 0 days per week.   Colonoscopy current       Medicare Wellness Billing Compliance Satisfied    *This is a visual tool to show completion of required items on the day of the visit. Green checks will only appear on the date of visit.    Review all medications by prescribing practitioner or clinical pharmacist (such as prescriptions, OTCs, herbal therapies and supplements) documented in the medical record    Past Medical, Surgical, and Family History reviewed and updated in chart    Tobacco Use Reviewed    Alcohol Use Reviewed    Illicit Drug Use Reviewed    PHQ2/9    Falls in Last Year Reviewed    Home Safety Risk Factors Reviewed    Cognitive Impairment Reviewed    Patient Self Assessment and Health Status    Current Diet Reviewed    Exercise Frequency    ADL - Hearing Impairment    ADL - Bathing    ADL - Dressing    ADL - Walks in Home    IADL - Managing Finances    IADL - Grocery Shopping    IADL - Taking Medications    IADL - Doing Housework        Review of Systems   Constitutional:  Negative for appetite change, chills, fatigue and fever.   HENT:  Negative for congestion, ear pain and sinus pressure.    Eyes:  Negative for visual disturbance.   Respiratory:  Negative for  "chest tightness, shortness of breath and wheezing.    Cardiovascular:  Negative for chest pain.   Gastrointestinal:  Negative for abdominal distention, abdominal pain, diarrhea and nausea.   Genitourinary:  Negative for difficulty urinating, dysuria and pelvic pain.   Musculoskeletal:  Negative for arthralgias and myalgias.   Skin:  Negative for rash.   Allergic/Immunologic: Negative for immunocompromised state.   Neurological:  Negative for light-headedness and headaches.   Hematological:  Negative for adenopathy. Does not bruise/bleed easily.   Psychiatric/Behavioral:  Negative for dysphoric mood and sleep disturbance. The patient is not nervous/anxious.        Objective   /76   Pulse 92   Resp 12   Ht 1.626 m (5' 4\")   Wt 80.3 kg (177 lb)   SpO2 99%   BMI 30.38 kg/m²    Physical Exam  Constitutional:       General: She is not in acute distress.     Appearance: Normal appearance. She is not ill-appearing.   HENT:      Head: Normocephalic and atraumatic.      Right Ear: Tympanic membrane, ear canal and external ear normal.      Left Ear: Tympanic membrane, ear canal and external ear normal.      Nose: Nose normal.      Mouth/Throat:      Mouth: Mucous membranes are moist.      Pharynx: No oropharyngeal exudate or posterior oropharyngeal erythema.   Eyes:      Extraocular Movements: Extraocular movements intact.      Conjunctiva/sclera: Conjunctivae normal.      Pupils: Pupils are equal, round, and reactive to light.   Neck:      Vascular: No carotid bruit.   Cardiovascular:      Rate and Rhythm: Normal rate and regular rhythm.      Heart sounds: Normal heart sounds. No murmur heard.  Pulmonary:      Breath sounds: Normal breath sounds. No wheezing, rhonchi or rales.   Abdominal:      General: Bowel sounds are normal. There is no distension.      Palpations: Abdomen is soft. There is no mass.      Tenderness: There is no abdominal tenderness.   Musculoskeletal:         General: No swelling or deformity. "      Cervical back: Neck supple. No tenderness.   Lymphadenopathy:      Cervical: No cervical adenopathy.   Skin:     General: Skin is warm and dry.      Findings: No lesion or rash.   Neurological:      Mental Status: She is alert and oriented to person, place, and time.      Sensory: No sensory deficit.      Motor: No weakness.      Coordination: Coordination normal.      Deep Tendon Reflexes: Reflexes normal.   Psychiatric:         Mood and Affect: Mood normal.         Behavior: Behavior normal.         Judgment: Judgment normal.           Assessment/Plan   Diagnoses and all orders for this visit:  Healthcare maintenance - Flu and Prevnar shots given, other vaccines current. Labs reviewed and discussed - will need to follow up with Dr Mensah. Referring to Gyn for wom[facility].  Chronic cough -no better after another course of Augmentin, steroids and changed off Lisinopril  Benign hypertension - doing well on Amlodipine.  GERD without esophagitis - stable with Omeprazole    Follow up here in 6months, 15min

## 2023-12-04 NOTE — PATIENT INSTRUCTIONS

## 2023-12-04 NOTE — PROGRESS NOTES
"Subjective   Reason for Visit: Keri Tolliver is an 68 y.o. female here for a Medicare Wellness visit.     Past Medical, Surgical, and Family History reviewed and updated in chart.    Reviewed all medications by prescribing practitioner or clinical pharmacist (such as prescriptions, OTCs, herbal therapies and supplements) and documented in the medical record.    HPI    Patient Care Team:  Tj Ospina MD as PCP - General  [facility]  Megan Hill LPN as Care Manager (Case Management)     Review of Systems    Objective   Vitals:  /76   Pulse 92   Resp 12   Ht 1.626 m (5' 4\")   Wt 80.3 kg (177 lb)   SpO2 99%   BMI 30.38 kg/m²       Physical Exam    Assessment/Plan   Problem List Items Addressed This Visit    None         "

## 2024-01-30 ENCOUNTER — PATIENT OUTREACH (OUTPATIENT)
Dept: CARE COORDINATION | Facility: CLINIC | Age: 69
End: 2024-01-30
Payer: MEDICARE

## 2024-06-03 ENCOUNTER — HOSPITAL ENCOUNTER (OUTPATIENT)
Dept: RADIOLOGY | Facility: CLINIC | Age: 69
Discharge: HOME | End: 2024-06-03
Payer: MEDICARE

## 2024-06-03 ENCOUNTER — OFFICE VISIT (OUTPATIENT)
Dept: PRIMARY CARE | Facility: CLINIC | Age: 69
End: 2024-06-03
Payer: MEDICARE

## 2024-06-03 VITALS
BODY MASS INDEX: 30.39 KG/M2 | DIASTOLIC BLOOD PRESSURE: 82 MMHG | HEART RATE: 88 BPM | WEIGHT: 178 LBS | HEIGHT: 64 IN | SYSTOLIC BLOOD PRESSURE: 124 MMHG | RESPIRATION RATE: 12 BRPM | OXYGEN SATURATION: 97 %

## 2024-06-03 DIAGNOSIS — I10 BENIGN HYPERTENSION: Primary | ICD-10-CM

## 2024-06-03 DIAGNOSIS — Z00.00 HEALTHCARE MAINTENANCE: ICD-10-CM

## 2024-06-03 DIAGNOSIS — E66.9 OBESITY (BMI 30-39.9): ICD-10-CM

## 2024-06-03 DIAGNOSIS — Z12.31 VISIT FOR SCREENING MAMMOGRAM: ICD-10-CM

## 2024-06-03 DIAGNOSIS — K21.9 GERD WITHOUT ESOPHAGITIS: ICD-10-CM

## 2024-06-03 DIAGNOSIS — I51.9 DIASTOLIC DYSFUNCTION, LEFT VENTRICLE: ICD-10-CM

## 2024-06-03 DIAGNOSIS — K21.9 GASTROESOPHAGEAL REFLUX DISEASE WITHOUT ESOPHAGITIS: ICD-10-CM

## 2024-06-03 PROCEDURE — 1159F MED LIST DOCD IN RCRD: CPT | Performed by: FAMILY MEDICINE

## 2024-06-03 PROCEDURE — 3074F SYST BP LT 130 MM HG: CPT | Performed by: FAMILY MEDICINE

## 2024-06-03 PROCEDURE — 1036F TOBACCO NON-USER: CPT | Performed by: FAMILY MEDICINE

## 2024-06-03 PROCEDURE — 3079F DIAST BP 80-89 MM HG: CPT | Performed by: FAMILY MEDICINE

## 2024-06-03 PROCEDURE — 1123F ACP DISCUSS/DSCN MKR DOCD: CPT | Performed by: FAMILY MEDICINE

## 2024-06-03 PROCEDURE — 75571 CT HRT W/O DYE W/CA TEST: CPT

## 2024-06-03 PROCEDURE — 1160F RVW MEDS BY RX/DR IN RCRD: CPT | Performed by: FAMILY MEDICINE

## 2024-06-03 PROCEDURE — 99213 OFFICE O/P EST LOW 20 MIN: CPT | Performed by: FAMILY MEDICINE

## 2024-06-03 RX ORDER — AMLODIPINE BESYLATE 10 MG/1
10 TABLET ORAL DAILY
Qty: 90 TABLET | Refills: 1 | Status: SHIPPED | OUTPATIENT
Start: 2024-06-03 | End: 2024-11-30

## 2024-06-03 RX ORDER — OMEPRAZOLE 40 MG/1
40 CAPSULE, DELAYED RELEASE ORAL DAILY
Qty: 90 CAPSULE | Refills: 1 | Status: SHIPPED | OUTPATIENT
Start: 2024-06-03

## 2024-06-03 ASSESSMENT — ENCOUNTER SYMPTOMS
DYSURIA: 0
FEVER: 0
MYALGIAS: 0
ARTHRALGIAS: 0
CHEST TIGHTNESS: 0
BRUISES/BLEEDS EASILY: 0
NAUSEA: 0
FATIGUE: 0
NERVOUS/ANXIOUS: 0
SLEEP DISTURBANCE: 0
DYSPHORIC MOOD: 0
CHILLS: 0
DIFFICULTY URINATING: 0
ABDOMINAL PAIN: 0
WHEEZING: 0
SHORTNESS OF BREATH: 0
SINUS PRESSURE: 0
ABDOMINAL DISTENTION: 0
HEADACHES: 0
DIARRHEA: 0
APPETITE CHANGE: 0
ADENOPATHY: 0
LIGHT-HEADEDNESS: 0

## 2024-06-03 NOTE — PROGRESS NOTES
"Subjective   Patient ID: Keri Tolliver is a 68 y.o. female who presents for Follow-up.  Pt has chronic diastolic dysfunction, HTN.  Pt is taking Lisinopril . Tolerating well.  Exercising 2 days per week   Low sodium diet is usually being followed.   Is not monitoring home blood pressures.   Denies HA, vision changes or CP.     GERD is well controlled on Omeprazole . Pt is taking medication daily. Denies epigastric pain, nausea, heartburn or water brash.             Review of Systems   Constitutional:  Negative for appetite change, chills, fatigue and fever.   HENT:  Negative for congestion, ear pain and sinus pressure.    Eyes:  Negative for visual disturbance.   Respiratory:  Negative for chest tightness, shortness of breath and wheezing.    Cardiovascular:  Negative for chest pain.   Gastrointestinal:  Negative for abdominal distention, abdominal pain, diarrhea and nausea.   Genitourinary:  Negative for difficulty urinating, dysuria and pelvic pain.   Musculoskeletal:  Negative for arthralgias and myalgias.   Skin:  Negative for rash.   Allergic/Immunologic: Negative for immunocompromised state.   Neurological:  Negative for light-headedness and headaches.   Hematological:  Negative for adenopathy. Does not bruise/bleed easily.   Psychiatric/Behavioral:  Negative for dysphoric mood and sleep disturbance. The patient is not nervous/anxious.        Objective   /82   Pulse 88   Resp 12   Ht 1.626 m (5' 4\")   Wt 80.7 kg (178 lb)   SpO2 97%   BMI 30.55 kg/m²    Physical Exam  Constitutional:       General: She is not in acute distress.     Appearance: Normal appearance.   Cardiovascular:      Rate and Rhythm: Normal rate and regular rhythm.      Heart sounds: Normal heart sounds. No murmur heard.  Pulmonary:      Effort: Pulmonary effort is normal.      Breath sounds: Normal breath sounds.   Abdominal:      Palpations: Abdomen is soft.      Tenderness: There is no abdominal tenderness.   Neurological:      " Mental Status: She is alert.   Psychiatric:         Mood and Affect: Mood normal.         Judgment: Judgment normal.           Assessment/Plan   Diagnoses and all orders for this visit:  Benign hypertension/ Diastolic dysfunction, left ventricle - stable with Lisinopril, continue and monitor.  Gastroesophageal reflux disease - stable on Omeprazole, continue at lowest effective dose.   Ordering CAC score.   URI/ cough - Recommend rest, increased fluids, nasal saline (Ie. Sinus Rinse) at least 3x daily, and Tylenol as needed.   Weight - recommend low carb diet, increasing water intake to at least 64oz/day, healthy snacking between meals, and regular cardiovascular exercise 150mins/week. Goal for weight loss is 1-2# per week.     Follow up in 6 months, 30min for physical

## 2024-06-03 NOTE — PROGRESS NOTES
"Subjective   Patient ID: Keri Tolliver is a 68 y.o. female who presents for Follow-up.    HPI     Review of Systems    Objective   /82   Pulse 88   Resp 12   Ht 1.626 m (5' 4\")   Wt 80.7 kg (178 lb)   SpO2 97%   BMI 30.55 kg/m²     Physical Exam    Assessment/Plan          "

## 2024-06-04 ENCOUNTER — TELEPHONE (OUTPATIENT)
Dept: PRIMARY CARE | Facility: CLINIC | Age: 69
End: 2024-06-04
Payer: MEDICARE

## 2024-06-04 NOTE — TELEPHONE ENCOUNTER
----- Message from Tj Ospina MD sent at 6/3/2024  7:18 PM EDT -----  Coronary artery calcium score is low which does not indicate higher risk of CV disease. Recommend continuing with healthy diet, regular exercise and maintaining healthy blood pressure and cholesterol levels.  Incidental note of fatty liver is not new - recommend low carb diet, healthy snacking and regular exercise.   ----- Message -----  From: Cecilia, Radiology Results In  Sent: 6/3/2024   3:54 PM EDT  To: Tj Ospina MD

## 2024-12-05 ENCOUNTER — APPOINTMENT (OUTPATIENT)
Dept: PRIMARY CARE | Facility: CLINIC | Age: 69
End: 2024-12-05
Payer: MEDICARE

## 2024-12-05 DIAGNOSIS — I10 BENIGN HYPERTENSION: ICD-10-CM

## 2024-12-05 DIAGNOSIS — K21.9 GERD WITHOUT ESOPHAGITIS: ICD-10-CM

## 2024-12-05 RX ORDER — OMEPRAZOLE 40 MG/1
40 CAPSULE, DELAYED RELEASE ORAL DAILY
Qty: 90 CAPSULE | Refills: 0 | Status: SHIPPED | OUTPATIENT
Start: 2024-12-05

## 2024-12-05 RX ORDER — AMLODIPINE BESYLATE 10 MG/1
10 TABLET ORAL DAILY
Qty: 90 TABLET | Refills: 0 | Status: SHIPPED | OUTPATIENT
Start: 2024-12-05 | End: 2025-06-03

## 2024-12-22 DIAGNOSIS — K21.9 GERD WITHOUT ESOPHAGITIS: ICD-10-CM

## 2025-01-02 RX ORDER — OMEPRAZOLE 40 MG/1
40 CAPSULE, DELAYED RELEASE ORAL DAILY
Qty: 90 CAPSULE | Refills: 0 | OUTPATIENT
Start: 2025-01-02

## 2025-01-20 ENCOUNTER — APPOINTMENT (OUTPATIENT)
Dept: PRIMARY CARE | Facility: CLINIC | Age: 70
End: 2025-01-20
Payer: MEDICARE

## 2025-01-20 VITALS
HEART RATE: 76 BPM | BODY MASS INDEX: 31.41 KG/M2 | HEIGHT: 64 IN | WEIGHT: 184 LBS | RESPIRATION RATE: 12 BRPM | DIASTOLIC BLOOD PRESSURE: 76 MMHG | SYSTOLIC BLOOD PRESSURE: 126 MMHG | OXYGEN SATURATION: 98 %

## 2025-01-20 DIAGNOSIS — K83.1 STRICTURE OF BILE DUCT (CMS-HCC): ICD-10-CM

## 2025-01-20 DIAGNOSIS — K21.9 GERD WITHOUT ESOPHAGITIS: ICD-10-CM

## 2025-01-20 DIAGNOSIS — M72.0 DUPUYTREN DISEASE OF PALM OF RIGHT HAND: ICD-10-CM

## 2025-01-20 DIAGNOSIS — E66.09 CLASS 1 OBESITY DUE TO EXCESS CALORIES WITHOUT SERIOUS COMORBIDITY WITH BODY MASS INDEX (BMI) OF 31.0 TO 31.9 IN ADULT: ICD-10-CM

## 2025-01-20 DIAGNOSIS — Z12.31 SCREENING MAMMOGRAM FOR BREAST CANCER: ICD-10-CM

## 2025-01-20 DIAGNOSIS — Z00.00 HEALTHCARE MAINTENANCE: Primary | ICD-10-CM

## 2025-01-20 DIAGNOSIS — M79.644 FINGER PAIN, RIGHT: ICD-10-CM

## 2025-01-20 DIAGNOSIS — I51.9 DIASTOLIC DYSFUNCTION, LEFT VENTRICLE: ICD-10-CM

## 2025-01-20 DIAGNOSIS — Z13.820 ENCOUNTER FOR OSTEOPOROSIS SCREENING IN ASYMPTOMATIC POSTMENOPAUSAL PATIENT: ICD-10-CM

## 2025-01-20 DIAGNOSIS — N32.81 OAB (OVERACTIVE BLADDER): ICD-10-CM

## 2025-01-20 DIAGNOSIS — K21.9 GASTROESOPHAGEAL REFLUX DISEASE WITHOUT ESOPHAGITIS: ICD-10-CM

## 2025-01-20 DIAGNOSIS — Z78.0 ENCOUNTER FOR OSTEOPOROSIS SCREENING IN ASYMPTOMATIC POSTMENOPAUSAL PATIENT: ICD-10-CM

## 2025-01-20 DIAGNOSIS — I10 BENIGN HYPERTENSION: ICD-10-CM

## 2025-01-20 DIAGNOSIS — K76.0 STEATOSIS OF LIVER: ICD-10-CM

## 2025-01-20 DIAGNOSIS — E66.811 CLASS 1 OBESITY DUE TO EXCESS CALORIES WITHOUT SERIOUS COMORBIDITY WITH BODY MASS INDEX (BMI) OF 31.0 TO 31.9 IN ADULT: ICD-10-CM

## 2025-01-20 PROBLEM — K57.32 DIVERTICULITIS OF COLON: Status: RESOLVED | Noted: 2023-05-26 | Resolved: 2025-01-20

## 2025-01-20 PROBLEM — E66.9 OBESITY (BMI 30-39.9): Status: RESOLVED | Noted: 2023-05-26 | Resolved: 2025-01-20

## 2025-01-20 PROBLEM — R09.82 POST-NASAL DRIP: Status: RESOLVED | Noted: 2023-11-10 | Resolved: 2025-01-20

## 2025-01-20 PROBLEM — J01.90 ACUTE SINUSITIS: Status: RESOLVED | Noted: 2023-05-26 | Resolved: 2025-01-20

## 2025-01-20 PROBLEM — M79.645 FINGER PAIN, LEFT: Status: ACTIVE | Noted: 2025-01-20

## 2025-01-20 PROBLEM — K57.92 ACUTE DIVERTICULITIS OF INTESTINE: Status: RESOLVED | Noted: 2023-05-26 | Resolved: 2025-01-20

## 2025-01-20 PROCEDURE — 1159F MED LIST DOCD IN RCRD: CPT | Performed by: FAMILY MEDICINE

## 2025-01-20 PROCEDURE — 90662 IIV NO PRSV INCREASED AG IM: CPT | Performed by: FAMILY MEDICINE

## 2025-01-20 PROCEDURE — 1160F RVW MEDS BY RX/DR IN RCRD: CPT | Performed by: FAMILY MEDICINE

## 2025-01-20 PROCEDURE — 99214 OFFICE O/P EST MOD 30 MIN: CPT | Performed by: FAMILY MEDICINE

## 2025-01-20 PROCEDURE — G0439 PPPS, SUBSEQ VISIT: HCPCS | Performed by: FAMILY MEDICINE

## 2025-01-20 PROCEDURE — 3078F DIAST BP <80 MM HG: CPT | Performed by: FAMILY MEDICINE

## 2025-01-20 PROCEDURE — G0008 ADMIN INFLUENZA VIRUS VAC: HCPCS | Performed by: FAMILY MEDICINE

## 2025-01-20 PROCEDURE — 3008F BODY MASS INDEX DOCD: CPT | Performed by: FAMILY MEDICINE

## 2025-01-20 PROCEDURE — 1124F ACP DISCUSS-NO DSCNMKR DOCD: CPT | Performed by: FAMILY MEDICINE

## 2025-01-20 PROCEDURE — 1170F FXNL STATUS ASSESSED: CPT | Performed by: FAMILY MEDICINE

## 2025-01-20 PROCEDURE — 3074F SYST BP LT 130 MM HG: CPT | Performed by: FAMILY MEDICINE

## 2025-01-20 PROCEDURE — 1036F TOBACCO NON-USER: CPT | Performed by: FAMILY MEDICINE

## 2025-01-20 RX ORDER — OMEPRAZOLE 40 MG/1
40 CAPSULE, DELAYED RELEASE ORAL DAILY
Qty: 100 CAPSULE | Refills: 1 | Status: SHIPPED | OUTPATIENT
Start: 2025-01-20

## 2025-01-20 RX ORDER — SOLIFENACIN SUCCINATE 5 MG/1
5 TABLET, FILM COATED ORAL DAILY
Qty: 100 TABLET | Refills: 1 | Status: SHIPPED | OUTPATIENT
Start: 2025-01-20 | End: 2026-01-20

## 2025-01-20 RX ORDER — AMLODIPINE BESYLATE 10 MG/1
10 TABLET ORAL DAILY
Qty: 100 TABLET | Refills: 1 | Status: SHIPPED | OUTPATIENT
Start: 2025-01-20 | End: 2025-07-19

## 2025-01-20 ASSESSMENT — ENCOUNTER SYMPTOMS
DEPRESSION: 0
NAUSEA: 0
FEVER: 0
NERVOUS/ANXIOUS: 0
SLEEP DISTURBANCE: 0
ADENOPATHY: 0
ABDOMINAL DISTENTION: 0
SHORTNESS OF BREATH: 0
DYSURIA: 0
OCCASIONAL FEELINGS OF UNSTEADINESS: 0
MYALGIAS: 0
BRUISES/BLEEDS EASILY: 0
FREQUENCY: 1
DIFFICULTY URINATING: 0
ABDOMINAL PAIN: 0
DYSPHORIC MOOD: 0
FATIGUE: 0
CHILLS: 0
APPETITE CHANGE: 0
DIARRHEA: 0
LOSS OF SENSATION IN FEET: 0
HEADACHES: 0
CHEST TIGHTNESS: 0
SINUS PRESSURE: 0
LIGHT-HEADEDNESS: 0
WHEEZING: 0
ARTHRALGIAS: 0

## 2025-01-20 ASSESSMENT — ACTIVITIES OF DAILY LIVING (ADL)
TAKING_MEDICATION: INDEPENDENT
DOING_HOUSEWORK: INDEPENDENT
MANAGING_FINANCES: INDEPENDENT
DRESSING: INDEPENDENT
BATHING: INDEPENDENT
GROCERY_SHOPPING: INDEPENDENT

## 2025-01-20 ASSESSMENT — PATIENT HEALTH QUESTIONNAIRE - PHQ9
SUM OF ALL RESPONSES TO PHQ9 QUESTIONS 1 AND 2: 0
1. LITTLE INTEREST OR PLEASURE IN DOING THINGS: NOT AT ALL
2. FEELING DOWN, DEPRESSED OR HOPELESS: NOT AT ALL

## 2025-01-20 NOTE — PROGRESS NOTES
Subjective   Patient ID: Keri Tolliver is a 69 y.o. female who presents for Medicare Annual Wellness Visit Subsequent.  PMHX, PSHx, Fam hx, and Social hx reviewed.   New concerns none  Vaccines Flu shot given today other vaccines current  Dentist seen at least yearly yes  Vision concerns none  Hearing concerns none  Diet is usually overall healthy.   Smoker - no  Lung CT screening - NA  Alcohol use - 2-3 drinks per week  Exercising 2 days per week.   Colonoscopy 2022  Mammogram 2022  Last PAP before age 66yr  DEXA >5yras  ACP - advance directives, code status, and DPOA documentation discussed     Pt has chronic HTN.  Pt is taking Amlodipine. Tolerating well.  Exercising 2 days per week   Low sodium diet is usually being followed.   Is not monitoring home blood pressures.   Denies HA, vision changes or CP.     GERD is well controlled on Omeprazole . Pt is taking medication daily. Denies epigastric pain, nausea, heartburn or water brash.     For OAB ongoing for 2-3 years she has never tried any treatment. Frequency is not bothersome.         Review of Systems   Constitutional:  Negative for appetite change, chills, fatigue and fever.   HENT:  Negative for congestion, ear pain and sinus pressure.    Eyes:  Negative for visual disturbance.   Respiratory:  Negative for chest tightness, shortness of breath and wheezing.    Cardiovascular:  Negative for chest pain.   Gastrointestinal:  Negative for abdominal distention, abdominal pain, diarrhea and nausea.   Genitourinary:  Positive for frequency. Negative for difficulty urinating, dysuria and pelvic pain.   Musculoskeletal:  Negative for arthralgias and myalgias.         2 Mildly tender nodules in R palm. R middle finger has split nail since previous finger surgery ~2017   Skin:  Negative for rash.   Allergic/Immunologic: Negative for immunocompromised state.   Neurological:  Negative for light-headedness and headaches.   Hematological:  Negative for adenopathy. Does not  "bruise/bleed easily.   Psychiatric/Behavioral:  Negative for dysphoric mood and sleep disturbance. The patient is not nervous/anxious.        Objective   /76   Pulse 76   Resp 12   Ht 1.626 m (5' 4\")   Wt 83.5 kg (184 lb)   SpO2 98%   BMI 31.58 kg/m²    Physical Exam  Constitutional:       General: She is not in acute distress.     Appearance: Normal appearance. She is not ill-appearing.   HENT:      Head: Normocephalic and atraumatic.      Right Ear: Tympanic membrane, ear canal and external ear normal.      Left Ear: Tympanic membrane, ear canal and external ear normal.      Nose: Nose normal.      Mouth/Throat:      Mouth: Mucous membranes are moist.      Pharynx: No oropharyngeal exudate or posterior oropharyngeal erythema.   Eyes:      Extraocular Movements: Extraocular movements intact.      Conjunctiva/sclera: Conjunctivae normal.      Pupils: Pupils are equal, round, and reactive to light.   Neck:      Thyroid: No thyroid mass or thyromegaly.      Vascular: No carotid bruit.   Cardiovascular:      Rate and Rhythm: Normal rate and regular rhythm.      Heart sounds: Normal heart sounds. No murmur heard.  Pulmonary:      Breath sounds: Normal breath sounds. No wheezing, rhonchi or rales.   Chest:      Comments: Pt declined clinical breast exam  Abdominal:      General: Bowel sounds are normal. There is no distension.      Palpations: Abdomen is soft. There is no mass.      Tenderness: There is no abdominal tenderness.   Musculoskeletal:         General: No swelling or deformity.      Cervical back: Neck supple. No tenderness.   Lymphadenopathy:      Cervical: No cervical adenopathy.   Skin:     General: Skin is warm and dry.      Findings: No lesion or rash.   Neurological:      Mental Status: She is alert and oriented to person, place, and time.      Sensory: No sensory deficit.      Motor: No weakness.      Coordination: Coordination normal.      Deep Tendon Reflexes: Reflexes normal. "   Psychiatric:         Mood and Affect: Mood normal.         Behavior: Behavior normal.         Judgment: Judgment normal.       Medicare Wellness Billing Compliance Satisfied    *This is a visual tool to show completion of required items on the day of the visit. Green checks will only appear on the date of visit.    Review all medications by prescribing practitioner or clinical pharmacist (such as prescriptions, OTCs, herbal therapies and supplements) documented in the medical record    Past Medical, Surgical, and Family History reviewed and updated in chart    Tobacco Use Reviewed    Alcohol Use Reviewed    Illicit Drug Use Reviewed    PHQ2/9    Falls in Last Year Reviewed    Home Safety Risk Factors Reviewed    Cognitive Impairment Reviewed    Patient Self Assessment and Health Status    Current Diet Reviewed    Exercise Frequency    ADL - Hearing Impairment    ADL - Bathing    ADL - Dressing    ADL - Walks in Home    IADL - Managing Finances    IADL - Grocery Shopping    IADL - Taking Medications    IADL - Doing Housework        Assessment/Plan   Diagnoses and all orders for this visit:  Healthcare maintenance - Flu shot given today, other vaccines current. Due to recheck fasting labs. Colonoscopy current. Mammogram and DEXA ordered.  Diastolic dysfunction, left ventricle/Benign hypertension - stable on Amlodipine, continue current dose and monitor  Finger pain, left/dupuytrens R hand - referring to orthopedics  GERD without esophagitis - stable with Omeprazole  Stricture of bile duct/Steatosis of liver - continue monitoring, per Dr Mensah  OAB - will start low dose Vesicare and monitor.  Weight - recommend low carb diet, increasing water intake to at least 64oz/day, healthy snacking between meals, and regular cardiovascular exercise 150mins/week. Goal for weight loss is 1-2# per week.     Follow up in 6 months, 15mins

## 2025-01-20 NOTE — PATIENT INSTRUCTIONS

## 2025-01-20 NOTE — PROGRESS NOTES
"Subjective   Reason for Visit: Keri Tolliver is an 69 y.o. female here for a Medicare Wellness visit.     Past Medical, Surgical, and Family History reviewed and updated in chart.    Reviewed all medications by prescribing practitioner or clinical pharmacist (such as prescriptions, OTCs, herbal therapies and supplements) and documented in the medical record.    HPI    Patient Care Team:  Tj Ospina MD as PCP - General  Tj Osipna MD as PCP - [facility] Attributed Provider     Review of Systems    Objective   Vitals:  /76   Pulse 76   Resp 12   Ht 1.626 m (5' 4\")   Wt 83.5 kg (184 lb)   SpO2 98%   BMI 31.58 kg/m²       Physical Exam    Assessment & Plan              "

## 2025-01-24 ENCOUNTER — OFFICE VISIT (OUTPATIENT)
Dept: URGENT CARE | Age: 70
End: 2025-01-24
Payer: MEDICARE

## 2025-01-24 VITALS
HEART RATE: 91 BPM | OXYGEN SATURATION: 97 % | SYSTOLIC BLOOD PRESSURE: 113 MMHG | DIASTOLIC BLOOD PRESSURE: 78 MMHG | TEMPERATURE: 98.2 F

## 2025-01-24 DIAGNOSIS — R05.1 ACUTE COUGH: ICD-10-CM

## 2025-01-24 DIAGNOSIS — J10.1 INFLUENZA A: Primary | ICD-10-CM

## 2025-01-24 DIAGNOSIS — Z20.822 COVID-19 VIRUS RNA NOT DETECTED: ICD-10-CM

## 2025-01-24 LAB
POC BINAX EXPIRATION: 0
POC BINAX NOW COVID SERIAL NUMBER: 0
POC RAPID INFLUENZA A: POSITIVE
POC RAPID INFLUENZA B: NEGATIVE
POC SARS-COV-2 AG BINAX: NORMAL

## 2025-01-24 RX ORDER — PROMETHAZINE HYDROCHLORIDE AND DEXTROMETHORPHAN HYDROBROMIDE 6.25; 15 MG/5ML; MG/5ML
5 SYRUP ORAL 4 TIMES DAILY PRN
Qty: 118 ML | Refills: 0 | Status: SHIPPED | OUTPATIENT
Start: 2025-01-24 | End: 2025-01-31

## 2025-01-24 NOTE — PROGRESS NOTES
Subjective   Patient ID: Keri Tolliver is a 69 y.o. female. They present today with a chief complaint of Cough (Cough- worsening x4 days, chills, achey only Mon/tues).    History of Present Illness  68 yo female coming in for sinus drainage, cough, congestion, chills, and body aches for the last 4 days. She states she was at her PCP's office on Monday and was starting with some scratchy throat and by that night she had chills and aches. She states she did get a flu vaccine on Monday while she was at the office. She denies any shortness of breath. She denies any other complaints.     Past Medical History  Allergies as of 01/24/2025 - Reviewed 01/20/2025   Allergen Reaction Noted    Sulfa (sulfonamide antibiotics) Unknown 05/26/2023       (Not in a hospital admission)       Past Medical History:   Diagnosis Date    Acute abdominal pain 05/26/2023    Acute diverticulitis of intestine 05/26/2023    Calculus of gallbladder without cholecystitis without obstruction 08/15/2017    Gallstones    Diverticulitis of colon 05/26/2023    Elevated erythrocyte sedimentation rate 06/26/2017    Elevated erythrocyte sedimentation rate    Encounter for immunization 10/21/2020    Need for shingles vaccine    Ganglion, right hand 12/04/2017    Ganglion of right hand    Left lower quadrant pain 06/29/2018    Abdominal pain, acute, left lower quadrant    Other chest pain 01/19/2018    Atypical chest pain    Other conditions influencing health status 10/25/2017    History of cough    Other nail disorders 02/27/2017    Change in nail appearance    Other nail disorders 12/04/2017    Nail deformity    Pain in right foot 12/15/2017    Right foot pain    Peroneal tendinitis, right leg 06/29/2018    Peroneal tendinitis of right lower extremity    Personal history of other diseases of the circulatory system 01/24/2017    History of abnormal electrocardiography    Personal history of other diseases of the digestive system 08/15/2017    History of  diverticulitis of colon    Personal history of other diseases of the musculoskeletal system and connective tissue 07/18/2017    History of low back pain    Personal history of other diseases of the respiratory system     History of acute bronchitis    Personal history of other diseases of the respiratory system 08/15/2017    History of acute bronchitis    Personal history of other diseases of the respiratory system 04/26/2019    History of acute sinusitis    Personal history of other diseases of the respiratory system 05/16/2019    History of acute bronchitis    Personal history of other diseases of the respiratory system 02/10/2014    History of respiratory tract infection    Personal history of other drug therapy 10/16/2018    History of influenza vaccination    Personal history of other specified conditions 01/19/2018    History of headache    Personal history of other specified conditions 08/15/2017    History of abdominal pain    Pleurodynia 10/28/2014    Rib pain on right side    Right lower quadrant pain 06/26/2017    Bilateral lower abdominal pain    Sprain of unspecified ligament of right ankle, initial encounter 06/12/2018    Severe sprain of right ankle       Past Surgical History:   Procedure Laterality Date    APPENDECTOMY N/A     OTHER SURGICAL HISTORY  09/26/2017    Laparoscopic Cholecystectomy With Cholangiography        reports that she quit smoking about 17 years ago. Her smoking use included cigarettes. She has never used smokeless tobacco. She reports current alcohol use of about 4.0 standard drinks of alcohol per week. She reports that she does not use drugs.    Review of Systems  Review of Systems:  General: No weight loss, fatigue, anorexia, insomnia, positive fever, chills.  ENT: Positive pharyngitis, no dry mouth, nasal congestion, ear pain  Cardiac: No chest pain, palpitations, syncope, near syncope.  Pulmonary:  No shortness of breath, positive cough, no hemoptysis  Heme/lymph: No swollen  glands, fever, bleeding  GI: No abdominal pain, change in bowel habits, melena, hematemesis, hematochezia, nausea, vomiting, or diarrhea  : No discharge, dysuria, frequency, urgency, hematuria  Musculoskeletal: No limb pain, joint pain, joint swelling. Positive body aches  Skin: No rashes  Neuro: No numbness, tingling, headaches                                 Objective    Vitals:    01/24/25 1048   BP: 113/78   BP Location: Left arm   Patient Position: Sitting   BP Cuff Size: Small adult   Pulse: 91   Temp: 36.8 °C (98.2 °F)   TempSrc: Oral   SpO2: 97%     No LMP recorded.    Physical Exam:  General: Vital noted, no distress. Afebrile  EENT: Eyes unremarkable, Pupils PERRLA, EOMs intact. TMs unremarkable. Posterior oropharynx unremarkable. Uvula in the midline and non-edematous. No PTA. No retropharyngeal mass. No Elijah's angina.  Cardiac: Regular rate and rhythm, no murmur  Pulmonary: Lungs clear bilaterally with good aeration. No adventitious breath sounds.  Skin: No rashes      Procedures    Point of Care Test & Imaging Results from this visit  Results for orders placed or performed in visit on 01/24/25   POCT Covid-19 Rapid Antigen   Result Value Ref Range    Binax NOW Covid Serial Number 0     BINAX NOW Covid Expiration 0     POC MARGARET-COV-2 AG  Presumptive negative test for SARS-CoV-2 (no antigen detected)     Presumptive negative test for SARS-CoV-2 (no antigen detected)   POCT Influenza A/B manually resulted   Result Value Ref Range    POC Rapid Influenza A Positive (A) Negative    POC Rapid Influenza B Negative Negative      No results found.    Diagnostic study results (if any) were reviewed by CAROL Meng.    Assessment/Plan   Allergies, medications, history, and pertinent labs/EKGs/Imaging reviewed by CAROL Meng.     Medical Decision Making  Testing: rapid covid and flu  Treatment: Phenergan DM prescribed. Patient advised on OTC medications for symptom relief  Differential:  1) covid, 2) flu , 3) pneumonia  Plan: Patient will follow up with the PCP in the next 2-3 days. Return for any worsening symptoms or go to the ER for further evaluation. Patient understands return precautions and discharge insturctions.  Impression:   1) Flu A      Orders and Diagnoses  Diagnoses and all orders for this visit:  Influenza A  -     promethazine-DM (Phenergan-DM) 6.25-15 mg/5 mL syrup; Take 5 mL by mouth 4 times a day as needed for cough for up to 7 days.  Acute cough  -     POCT Covid-19 Rapid Antigen  -     POCT Influenza A/B manually resulted  COVID-19 virus RNA not detected      Medical Admin Record      Patient disposition: Home    Electronically signed by CAROL Meng  11:27 AM

## 2025-01-30 ENCOUNTER — OFFICE VISIT (OUTPATIENT)
Dept: URGENT CARE | Age: 70
End: 2025-01-30
Payer: MEDICARE

## 2025-01-30 VITALS
BODY MASS INDEX: 30.39 KG/M2 | HEIGHT: 64 IN | DIASTOLIC BLOOD PRESSURE: 73 MMHG | RESPIRATION RATE: 16 BRPM | HEART RATE: 84 BPM | OXYGEN SATURATION: 98 % | SYSTOLIC BLOOD PRESSURE: 126 MMHG | TEMPERATURE: 98.2 F | WEIGHT: 178 LBS

## 2025-01-30 DIAGNOSIS — J22 ACUTE LOWER RESPIRATORY TRACT INFECTION: Primary | ICD-10-CM

## 2025-01-30 RX ORDER — BENZONATATE 200 MG/1
200 CAPSULE ORAL 3 TIMES DAILY PRN
Qty: 30 CAPSULE | Refills: 0 | Status: SHIPPED | OUTPATIENT
Start: 2025-01-30 | End: 2025-02-06

## 2025-01-30 RX ORDER — AZITHROMYCIN 250 MG/1
TABLET, FILM COATED ORAL
Qty: 6 TABLET | Refills: 0 | Status: SHIPPED | OUTPATIENT
Start: 2025-01-30 | End: 2025-02-04

## 2025-01-30 RX ORDER — ALBUTEROL SULFATE 90 UG/1
2 INHALANT RESPIRATORY (INHALATION) EVERY 4 HOURS PRN
Qty: 6.7 G | Refills: 0 | Status: SHIPPED | OUTPATIENT
Start: 2025-01-30 | End: 2026-01-30

## 2025-01-30 RX ORDER — METHYLPREDNISOLONE 4 MG/1
TABLET ORAL
Qty: 21 TABLET | Refills: 0 | Status: SHIPPED | OUTPATIENT
Start: 2025-01-30 | End: 2025-02-05

## 2025-01-30 ASSESSMENT — ENCOUNTER SYMPTOMS
SINUS PRESSURE: 0
SINUS PAIN: 0
CHEST TIGHTNESS: 0
SHORTNESS OF BREATH: 0
COUGH: 1
CONSTITUTIONAL NEGATIVE: 1

## 2025-01-30 ASSESSMENT — PATIENT HEALTH QUESTIONNAIRE - PHQ9
1. LITTLE INTEREST OR PLEASURE IN DOING THINGS: NOT AT ALL
SUM OF ALL RESPONSES TO PHQ9 QUESTIONS 1 AND 2: 0
2. FEELING DOWN, DEPRESSED OR HOPELESS: NOT AT ALL

## 2025-01-30 ASSESSMENT — PAIN SCALES - GENERAL: PAINLEVEL_OUTOF10: 0-NO PAIN

## 2025-01-30 NOTE — PROGRESS NOTES
Subjective   Patient ID: Keri Tolliver is a 69 y.o. female. They present today with a chief complaint of Cough (X 1+ weeks and not getting better).    History of Present Illness  Patient has had a dry cough for over a week.  She tested positive for influenza A.  Her other symptoms have resolved but she still has a dry cough.  She is verbalizing her concerns for a possible bronchitis or other infection      History provided by:  Patient   used: No    Cough  Pertinent negatives include no shortness of breath.       Past Medical History  Allergies as of 01/30/2025 - Reviewed 01/30/2025   Allergen Reaction Noted    Sulfa (sulfonamide antibiotics) Unknown 05/26/2023       (Not in a hospital admission)       Past Medical History:   Diagnosis Date    Acute abdominal pain 05/26/2023    Acute diverticulitis of intestine 05/26/2023    Calculus of gallbladder without cholecystitis without obstruction 08/15/2017    Gallstones    Diverticulitis of colon 05/26/2023    Elevated erythrocyte sedimentation rate 06/26/2017    Elevated erythrocyte sedimentation rate    Encounter for immunization 10/21/2020    Need for shingles vaccine    Ganglion, right hand 12/04/2017    Ganglion of right hand    Left lower quadrant pain 06/29/2018    Abdominal pain, acute, left lower quadrant    Other chest pain 01/19/2018    Atypical chest pain    Other conditions influencing health status 10/25/2017    History of cough    Other nail disorders 02/27/2017    Change in nail appearance    Other nail disorders 12/04/2017    Nail deformity    Pain in right foot 12/15/2017    Right foot pain    Peroneal tendinitis, right leg 06/29/2018    Peroneal tendinitis of right lower extremity    Personal history of other diseases of the circulatory system 01/24/2017    History of abnormal electrocardiography    Personal history of other diseases of the digestive system 08/15/2017    History of diverticulitis of colon    Personal history of  other diseases of the musculoskeletal system and connective tissue 07/18/2017    History of low back pain    Personal history of other diseases of the respiratory system     History of acute bronchitis    Personal history of other diseases of the respiratory system 08/15/2017    History of acute bronchitis    Personal history of other diseases of the respiratory system 04/26/2019    History of acute sinusitis    Personal history of other diseases of the respiratory system 05/16/2019    History of acute bronchitis    Personal history of other diseases of the respiratory system 02/10/2014    History of respiratory tract infection    Personal history of other drug therapy 10/16/2018    History of influenza vaccination    Personal history of other specified conditions 01/19/2018    History of headache    Personal history of other specified conditions 08/15/2017    History of abdominal pain    Pleurodynia 10/28/2014    Rib pain on right side    Right lower quadrant pain 06/26/2017    Bilateral lower abdominal pain    Sprain of unspecified ligament of right ankle, initial encounter 06/12/2018    Severe sprain of right ankle       Past Surgical History:   Procedure Laterality Date    APPENDECTOMY N/A     OTHER SURGICAL HISTORY  09/26/2017    Laparoscopic Cholecystectomy With Cholangiography        reports that she quit smoking about 17 years ago. Her smoking use included cigarettes. She has never used smokeless tobacco. She reports current alcohol use of about 4.0 standard drinks of alcohol per week. She reports that she does not use drugs.    Review of Systems  Review of Systems   Constitutional: Negative.    HENT:  Negative for congestion, sinus pressure and sinus pain.    Respiratory:  Positive for cough. Negative for chest tightness and shortness of breath.                                   Objective    Vitals:    01/30/25 1332   BP: 126/73   Pulse: 84   Resp: 16   Temp: 36.8 °C (98.2 °F)   TempSrc: Oral   SpO2: 98%  "  Weight: 80.7 kg (178 lb)   Height: 1.626 m (5' 4\")     No LMP recorded. Patient is postmenopausal.    Physical Exam  Vitals and nursing note reviewed.   Constitutional:       Comments: Pleasant cooperative 69-year-old female in no acute distress.   HENT:      Head: Normocephalic and atraumatic.      Nose: Nose normal.   Eyes:      Extraocular Movements: Extraocular movements intact.      Conjunctiva/sclera: Conjunctivae normal.   Cardiovascular:      Rate and Rhythm: Normal rate and regular rhythm.   Pulmonary:      Effort: Pulmonary effort is normal. No respiratory distress.      Breath sounds: Normal breath sounds.      Comments: Frequent bronchospastic cough noted  Musculoskeletal:         General: Normal range of motion.      Cervical back: Neck supple.   Skin:     General: Skin is warm and dry.   Neurological:      General: No focal deficit present.      Mental Status: She is oriented to person, place, and time.   Psychiatric:         Mood and Affect: Mood normal.         Procedures    Point of Care Test & Imaging Results from this visit  No results found for this visit on 01/30/25.   No results found.    Diagnostic study results (if any) were reviewed by Madalyn Nguyen PA-C.    Assessment/Plan   Allergies, medications, history, and pertinent labs/EKGs/Imaging reviewed by Madalyn Nguyen PA-C.     Medical Decision Making  History and physical examination are most consistent with a postviral cough.  However, out of an abundance of caution because there is an atypical infection prevalent in this area in addition to treating her with antitussive medication with her a prescription for Zithromax as well.  If she feels worse instead of improving, especially if she develops chest pain fatigue shortness of breath or other concerning symptoms she will go to the emergency department.    Orders and Diagnoses  There are no diagnoses linked to this encounter.    Medical Admin Record      Patient disposition: " Home    Electronically signed by Madalyn Nguyen PA-C  2:07 PM

## 2025-02-25 ENCOUNTER — OFFICE VISIT (OUTPATIENT)
Dept: ORTHOPEDIC SURGERY | Facility: CLINIC | Age: 70
End: 2025-02-25
Payer: MEDICARE

## 2025-02-25 DIAGNOSIS — L60.8 ACQUIRED DEFORMITY OF NAIL OF FINGER: ICD-10-CM

## 2025-02-25 DIAGNOSIS — M79.644 FINGER PAIN, RIGHT: ICD-10-CM

## 2025-02-25 DIAGNOSIS — M72.0 DUPUYTREN DISEASE OF PALM OF RIGHT HAND: ICD-10-CM

## 2025-02-25 PROCEDURE — 1159F MED LIST DOCD IN RCRD: CPT | Performed by: ORTHOPAEDIC SURGERY

## 2025-02-25 PROCEDURE — 1036F TOBACCO NON-USER: CPT | Performed by: ORTHOPAEDIC SURGERY

## 2025-02-25 PROCEDURE — 1123F ACP DISCUSS/DSCN MKR DOCD: CPT | Performed by: ORTHOPAEDIC SURGERY

## 2025-02-25 PROCEDURE — 99213 OFFICE O/P EST LOW 20 MIN: CPT | Performed by: ORTHOPAEDIC SURGERY

## 2025-02-25 PROCEDURE — 1160F RVW MEDS BY RX/DR IN RCRD: CPT | Performed by: ORTHOPAEDIC SURGERY

## 2025-02-25 PROCEDURE — 99203 OFFICE O/P NEW LOW 30 MIN: CPT | Performed by: ORTHOPAEDIC SURGERY

## 2025-02-25 NOTE — LETTER
additional concerns.        HISTORY OF PRESENT ILLNESS       Patient is a 69 y.o. right-hand dominant female retiree, who presents today at request of Dr. Ospina for evaluation of a couple of issues.  She has noticed some small nodules in the palm in the right ring ray.  These are not painful and are not interfering with hand function.  She is simply concerned about the nature.  No similar masses elsewhere.  The primary issue is with thinning and splitting of the central line of the right long finger nail.  This developed after a mucous cyst excision in 2017.  She denies infection.  The nail split has been persistent to one degree or another since that time.  No similar problems in other digits.  No difficulty with finger motion.  No night pain or rest pain.    She is not diabetic or hypothyroid.  She does not smoke.      REVIEW OF SYSTEMS       A 30-item multi-system Review Of Systems was obtained on today's intake form.  This was reviewed with the patient and is correct.  The pertinent positives and negatives are listed above.  The form has been scanned separately into the medical record.      PHYSICAL EXAM    Constitutional:    Appears stated age. Well-developed and well-nourished female in no acute distress.  Psychiatric:         Pleasant normal mood and affect. Behavior is appropriate for the situation.   Head:                   Normocephalic and atraumatic.  Eyes:                    Pupils are equal and round.  Cardiovascular:  2+ radial and ulnar pulses. Fingers well-perfused.  Respiratory:        Effort normal. No respiratory distress. Speaking in complete sentences.  Neurologic:       Alert and oriented to person, place, and time.  Skin:                Skin is intact, warm and dry.  Hematologic / Lymphatic:    No lymphedema or lymphangitis.    Extremities / Musculoskeletal:                      Skin of the right hand and wrist is intact with no erythema, ecchymosis, or diffuse swelling.  There are a  couple of 3 mm firm nodules adjacent to the palmar crease in the ring ray.  These are nontender, nonpulsatile, with no Tinel's sign, and do not move with the flexor tendons.  No similar nodules elsewhere.  Full composite flexion extension with no skin blanching.  Good sagittal plane balance.  Long finger has central thinning, about a millimeter wide, with a split in the distal half of the nail, though not as far proximal as the lunula.  No similar features on other nails.  DIP joint stable to varus and valgus stress.  No palpable masses dorsally.  No bone spurs.      IMAGING / LABS / EMGs           None pertinent      Past Medical History:   Diagnosis Date   • Acute abdominal pain 05/26/2023   • Acute diverticulitis of intestine 05/26/2023   • Calculus of gallbladder without cholecystitis without obstruction 08/15/2017    Gallstones   • Diverticulitis of colon 05/26/2023   • Elevated erythrocyte sedimentation rate 06/26/2017    Elevated erythrocyte sedimentation rate   • Encounter for immunization 10/21/2020    Need for shingles vaccine   • Ganglion, right hand 12/04/2017    Ganglion of right hand   • Left lower quadrant pain 06/29/2018    Abdominal pain, acute, left lower quadrant   • Other chest pain 01/19/2018    Atypical chest pain   • Other conditions influencing health status 10/25/2017    History of cough   • Other nail disorders 02/27/2017    Change in nail appearance   • Other nail disorders 12/04/2017    Nail deformity   • Pain in right foot 12/15/2017    Right foot pain   • Peroneal tendinitis, right leg 06/29/2018    Peroneal tendinitis of right lower extremity   • Personal history of other diseases of the circulatory system 01/24/2017    History of abnormal electrocardiography   • Personal history of other diseases of the digestive system 08/15/2017    History of diverticulitis of colon   • Personal history of other diseases of the musculoskeletal system and connective tissue 07/18/2017    History of  low back pain   • Personal history of other diseases of the respiratory system     History of acute bronchitis   • Personal history of other diseases of the respiratory system 08/15/2017    History of acute bronchitis   • Personal history of other diseases of the respiratory system 04/26/2019    History of acute sinusitis   • Personal history of other diseases of the respiratory system 05/16/2019    History of acute bronchitis   • Personal history of other diseases of the respiratory system 02/10/2014    History of respiratory tract infection   • Personal history of other drug therapy 10/16/2018    History of influenza vaccination   • Personal history of other specified conditions 01/19/2018    History of headache   • Personal history of other specified conditions 08/15/2017    History of abdominal pain   • Pleurodynia 10/28/2014    Rib pain on right side   • Right lower quadrant pain 06/26/2017    Bilateral lower abdominal pain   • Sprain of unspecified ligament of right ankle, initial encounter 06/12/2018    Severe sprain of right ankle       Medication Documentation Review Audit       Reviewed by Madalyn Nguyen PA-C (Physician Assistant) on 01/30/25 at 1402      Medication Order Taking? Sig Documenting Provider Last Dose Status   amLODIPine (Norvasc) 10 mg tablet [id number] Yes Take 1 tablet (10 mg) by mouth once daily. Tj Ospina MD  Active   omeprazole (PriLOSEC) 40 mg DR capsule [id number] Yes Take 1 capsule (40 mg) by mouth once daily. Do not crush or chew. Tj Ospina MD  Active   promethazine-DM (Phenergan-DM) 6.25-15 mg/5 mL syrup [id number] Yes Take 5 mL by mouth 4 times a day as needed for cough for up to 7 days. Cha Melo, APRN-CNP  Active   solifenacin (Vesicare) 5 mg tablet [id number] Yes Take 1 tablet (5 mg) by mouth once daily. Swallow tablet whole; do not crush, chew, or split. Tj Ospina MD  Active                    Allergies   Allergen Reactions   • Sulfa  (Sulfonamide Antibiotics) Unknown       Social History     Socioeconomic History   • Marital status:      Spouse name: Not on file   • Number of children: Not on file   • Years of education: Not on file   • Highest education level: Not on file   Occupational History   • Not on file   Tobacco Use   • Smoking status: Former     Current packs/day: 0.00     Types: Cigarettes     Quit date:      Years since quittin.1   • Smokeless tobacco: Never   Substance and Sexual Activity   • Alcohol use: Yes     Alcohol/week: 4.0 standard drinks of alcohol     Types: 4 Standard drinks or equivalent per week   • Drug use: Never   • Sexual activity: Not on file   Other Topics Concern   • Not on file   Social History Narrative   • Not on file     Social Drivers of Health     Financial Resource Strain: Not on file   Food Insecurity: Not on file   Transportation Needs: Not on file   Physical Activity: Not on file   Stress: Not on file   Social Connections: Not on file   Intimate Partner Violence: Not on file   Housing Stability: Not on file       Past Surgical History:   Procedure Laterality Date   • APPENDECTOMY N/A    • OTHER SURGICAL HISTORY  2017    Laparoscopic Cholecystectomy With Cholangiography         Electronically Signed      BRYN Spencer MD      Orthopaedic Hand Surgery      [phone]

## 2025-02-25 NOTE — PROGRESS NOTES
CHIEF COMPLAINT         Right hand pain    ASSESSMENT + PLAN     ***        HISTORY OF PRESENT ILLNESS       Patient is a 69 y.o. ***-hand dominant female ***, who presents today for evaluation of ***      REVIEW OF SYSTEMS       A 30-item multi-system Review Of Systems was obtained on today's intake form.  This was reviewed with the patient and is correct.  The pertinent positives and negatives are listed above.  The form has been scanned separately into the medical record.      PHYSICAL EXAM    Constitutional:    Appears stated age. Well-developed and well-nourished ***.  Psychiatric:         Pleasant normal mood and affect. Behavior is appropriate for the situation.   Head:                   Normocephalic and atraumatic.  Eyes:                    Pupils are equal and round.  Cardiovascular:  2+ radial and ulnar pulses. Fingers well-perfused.  Respiratory:        Effort normal. No respiratory distress. Speaking in complete sentences.  Neurologic:       Alert and oriented to person, place, and time.  Skin:                Skin is intact, warm and dry.  Hematologic / Lymphatic:    No lymphedema or lymphangitis.    Extremities / Musculoskeletal:                      ***      IMAGING / LABS / EMGs           ***      Past Medical History:   Diagnosis Date    Acute abdominal pain 05/26/2023    Acute diverticulitis of intestine 05/26/2023    Calculus of gallbladder without cholecystitis without obstruction 08/15/2017    Gallstones    Diverticulitis of colon 05/26/2023    Elevated erythrocyte sedimentation rate 06/26/2017    Elevated erythrocyte sedimentation rate    Encounter for immunization 10/21/2020    Need for shingles vaccine    Ganglion, right hand 12/04/2017    Ganglion of right hand    Left lower quadrant pain 06/29/2018    Abdominal pain, acute, left lower quadrant    Other chest pain 01/19/2018    Atypical chest pain    Other conditions influencing health status 10/25/2017    History of cough    Other nail  disorders 02/27/2017    Change in nail appearance    Other nail disorders 12/04/2017    Nail deformity    Pain in right foot 12/15/2017    Right foot pain    Peroneal tendinitis, right leg 06/29/2018    Peroneal tendinitis of right lower extremity    Personal history of other diseases of the circulatory system 01/24/2017    History of abnormal electrocardiography    Personal history of other diseases of the digestive system 08/15/2017    History of diverticulitis of colon    Personal history of other diseases of the musculoskeletal system and connective tissue 07/18/2017    History of low back pain    Personal history of other diseases of the respiratory system     History of acute bronchitis    Personal history of other diseases of the respiratory system 08/15/2017    History of acute bronchitis    Personal history of other diseases of the respiratory system 04/26/2019    History of acute sinusitis    Personal history of other diseases of the respiratory system 05/16/2019    History of acute bronchitis    Personal history of other diseases of the respiratory system 02/10/2014    History of respiratory tract infection    Personal history of other drug therapy 10/16/2018    History of influenza vaccination    Personal history of other specified conditions 01/19/2018    History of headache    Personal history of other specified conditions 08/15/2017    History of abdominal pain    Pleurodynia 10/28/2014    Rib pain on right side    Right lower quadrant pain 06/26/2017    Bilateral lower abdominal pain    Sprain of unspecified ligament of right ankle, initial encounter 06/12/2018    Severe sprain of right ankle       Medication Documentation Review Audit       Reviewed by Madalyn Nguyen PA-C (Physician Assistant) on 01/30/25 at 1402      Medication Order Taking? Sig Documenting Provider Last Dose Status   amLODIPine (Norvasc) 10 mg tablet [id number] Yes Take 1 tablet (10 mg) by mouth once daily. Tj WALLS  MD Bhavesh  Active   omeprazole (PriLOSEC) 40 mg DR capsule [id number] Yes Take 1 capsule (40 mg) by mouth once daily. Do not crush or chew. Tj Ospina MD  Active   promethazine-DM (Phenergan-DM) 6.25-15 mg/5 mL syrup [id number] Yes Take 5 mL by mouth 4 times a day as needed for cough for up to 7 days. Cha Melo APRN-CNP  Active   solifenacin (Vesicare) 5 mg tablet [id number] Yes Take 1 tablet (5 mg) by mouth once daily. Swallow tablet whole; do not crush, chew, or split. Tj Ospina MD  Active                    Allergies   Allergen Reactions    Sulfa (Sulfonamide Antibiotics) Unknown       Social History     Socioeconomic History    Marital status:      Spouse name: Not on file    Number of children: Not on file    Years of education: Not on file    Highest education level: Not on file   Occupational History    Not on file   Tobacco Use    Smoking status: Former     Current packs/day: 0.00     Types: Cigarettes     Quit date:      Years since quittin.1    Smokeless tobacco: Never   Substance and Sexual Activity    Alcohol use: Yes     Alcohol/week: 4.0 standard drinks of alcohol     Types: 4 Standard drinks or equivalent per week    Drug use: Never    Sexual activity: Not on file   Other Topics Concern    Not on file   Social History Narrative    Not on file     Social Drivers of Health     Financial Resource Strain: Not on file   Food Insecurity: Not on file   Transportation Needs: Not on file   Physical Activity: Not on file   Stress: Not on file   Social Connections: Not on file   Intimate Partner Violence: Not on file   Housing Stability: Not on file       Past Surgical History:   Procedure Laterality Date    APPENDECTOMY N/A     OTHER SURGICAL HISTORY  2017    Laparoscopic Cholecystectomy With Cholangiography         Electronically Signed      BRYN Spencer MD      Orthopaedic Hand Surgery      [phone]   vaccination    Personal history of other specified conditions 2018    History of headache    Personal history of other specified conditions 08/15/2017    History of abdominal pain    Pleurodynia 10/28/2014    Rib pain on right side    Right lower quadrant pain 2017    Bilateral lower abdominal pain    Sprain of unspecified ligament of right ankle, initial encounter 2018    Severe sprain of right ankle       Medication Documentation Review Audit       Reviewed by Madalyn Nguyen PA-C (Physician Assistant) on 25 at 1402      Medication Order Taking? Sig Documenting Provider Last Dose Status   amLODIPine (Norvasc) 10 mg tablet [id number] Yes Take 1 tablet (10 mg) by mouth once daily. Tj Ospina MD  Active   omeprazole (PriLOSEC) 40 mg DR capsule [id number] Yes Take 1 capsule (40 mg) by mouth once daily. Do not crush or chew. Tj Ospina MD  Active   promethazine-DM (Phenergan-DM) 6.25-15 mg/5 mL syrup [id number] Yes Take 5 mL by mouth 4 times a day as needed for cough for up to 7 days. Cha Melo, APRN-CNP  Active   solifenacin (Vesicare) 5 mg tablet [id number] Yes Take 1 tablet (5 mg) by mouth once daily. Swallow tablet whole; do not crush, chew, or split. Tj Ospina MD  Active                    Allergies   Allergen Reactions    Sulfa (Sulfonamide Antibiotics) Unknown       Social History     Socioeconomic History    Marital status:      Spouse name: Not on file    Number of children: Not on file    Years of education: Not on file    Highest education level: Not on file   Occupational History    Not on file   Tobacco Use    Smoking status: Former     Current packs/day: 0.00     Types: Cigarettes     Quit date:      Years since quittin.1    Smokeless tobacco: Never   Substance and Sexual Activity    Alcohol use: Yes     Alcohol/week: 4.0 standard drinks of alcohol     Types: 4 Standard drinks or equivalent per week    Drug use: Never    Sexual  activity: Not on file   Other Topics Concern    Not on file   Social History Narrative    Not on file     Social Drivers of Health     Financial Resource Strain: Not on file   Food Insecurity: Not on file   Transportation Needs: Not on file   Physical Activity: Not on file   Stress: Not on file   Social Connections: Not on file   Intimate Partner Violence: Not on file   Housing Stability: Not on file       Past Surgical History:   Procedure Laterality Date    APPENDECTOMY N/A     OTHER SURGICAL HISTORY  09/26/2017    Laparoscopic Cholecystectomy With Cholangiography         Electronically Signed      BRYN Spencer MD      Orthopaedic Hand Surgery      [phone]

## 2025-02-25 NOTE — Clinical Note
February 26, 2025     Tj Ospina MD  [address]  [facility], [address]  [facility] [place] [facility] [address]    Patient: Keri Tolliver   YOB: 1955   Date of Visit: 2/25/2025       Dear Dr. Tj Ospina MD:    Thank you for referring Keri Tolliver to me for evaluation. Below are my notes for this consultation.  If you have questions, please do not hesitate to call me. I look forward to following your patient along with you.       Sincerely,     Miki Spencer MD      CC: No Recipients  ______________________________________________________________________________________

## 2025-07-22 ENCOUNTER — APPOINTMENT (OUTPATIENT)
Dept: PRIMARY CARE | Facility: CLINIC | Age: 70
End: 2025-07-22
Payer: MEDICARE

## 2025-07-22 VITALS
SYSTOLIC BLOOD PRESSURE: 128 MMHG | OXYGEN SATURATION: 97 % | RESPIRATION RATE: 12 BRPM | BODY MASS INDEX: 31.41 KG/M2 | DIASTOLIC BLOOD PRESSURE: 74 MMHG | HEIGHT: 64 IN | WEIGHT: 184 LBS | HEART RATE: 80 BPM

## 2025-07-22 DIAGNOSIS — I10 BENIGN HYPERTENSION: ICD-10-CM

## 2025-07-22 DIAGNOSIS — K21.9 GERD WITHOUT ESOPHAGITIS: ICD-10-CM

## 2025-07-22 DIAGNOSIS — N32.81 OAB (OVERACTIVE BLADDER): ICD-10-CM

## 2025-07-22 PROCEDURE — 3078F DIAST BP <80 MM HG: CPT | Performed by: FAMILY MEDICINE

## 2025-07-22 PROCEDURE — 1159F MED LIST DOCD IN RCRD: CPT | Performed by: FAMILY MEDICINE

## 2025-07-22 PROCEDURE — 3074F SYST BP LT 130 MM HG: CPT | Performed by: FAMILY MEDICINE

## 2025-07-22 PROCEDURE — 3008F BODY MASS INDEX DOCD: CPT | Performed by: FAMILY MEDICINE

## 2025-07-22 PROCEDURE — 99213 OFFICE O/P EST LOW 20 MIN: CPT | Performed by: FAMILY MEDICINE

## 2025-07-22 PROCEDURE — G2211 COMPLEX E/M VISIT ADD ON: HCPCS | Performed by: FAMILY MEDICINE

## 2025-07-22 RX ORDER — SOLIFENACIN SUCCINATE 5 MG/1
5 TABLET, FILM COATED ORAL DAILY
Qty: 100 TABLET | Refills: 1 | Status: SHIPPED | OUTPATIENT
Start: 2025-07-22 | End: 2026-07-22

## 2025-07-22 RX ORDER — OMEPRAZOLE 40 MG/1
40 CAPSULE, DELAYED RELEASE ORAL DAILY
Qty: 100 CAPSULE | Refills: 1 | Status: SHIPPED | OUTPATIENT
Start: 2025-07-22

## 2025-07-22 RX ORDER — AMLODIPINE BESYLATE 10 MG/1
10 TABLET ORAL DAILY
Qty: 100 TABLET | Refills: 1 | Status: SHIPPED | OUTPATIENT
Start: 2025-07-22

## 2025-07-22 ASSESSMENT — ENCOUNTER SYMPTOMS
FATIGUE: 0
APPETITE CHANGE: 0
CHEST TIGHTNESS: 0
WHEEZING: 0
DIFFICULTY URINATING: 0
DIARRHEA: 0
MYALGIAS: 0
ABDOMINAL PAIN: 0
ARTHRALGIAS: 0
CHILLS: 0
SINUS PRESSURE: 0
SLEEP DISTURBANCE: 0
DYSPHORIC MOOD: 0
HEADACHES: 0
BRUISES/BLEEDS EASILY: 0
ABDOMINAL DISTENTION: 0
LIGHT-HEADEDNESS: 0
ADENOPATHY: 0
NERVOUS/ANXIOUS: 0
NAUSEA: 0
SHORTNESS OF BREATH: 0
FEVER: 0
DYSURIA: 0

## 2025-07-22 NOTE — PROGRESS NOTES
"Subjective   Patient ID: Keri Tolliver is a 69 y.o. female who presents for Follow-up.  Pt has chronic HTN, diastolic dysfunction   Pt is taking Amlodipine. Tolerating well.  Exercising 1 days per week   Low sodium diet is usually being followed.   Is not monitoring home blood pressures.  Denies HA, vision changes or CP.     GERD is well controlled on Omeprazole . Pt is taking medication daily. Denies epigastric pain, nausea, heartburn or water brash.     For OAB  taking Vesciare . Doing well and tolerating medication well. Frequency is not bothersome.                 Review of Systems   Constitutional:  Negative for appetite change, chills, fatigue and fever.   HENT:  Negative for congestion, ear pain and sinus pressure.    Eyes:  Negative for visual disturbance.   Respiratory:  Negative for chest tightness, shortness of breath and wheezing.    Cardiovascular:  Negative for chest pain.   Gastrointestinal:  Negative for abdominal distention, abdominal pain, diarrhea and nausea.   Genitourinary:  Negative for difficulty urinating, dysuria and pelvic pain.   Musculoskeletal:  Negative for arthralgias and myalgias.   Skin:  Negative for rash.   Allergic/Immunologic: Negative for immunocompromised state.   Neurological:  Negative for light-headedness and headaches.   Hematological:  Negative for adenopathy. Does not bruise/bleed easily.   Psychiatric/Behavioral:  Negative for dysphoric mood and sleep disturbance. The patient is not nervous/anxious.        Objective   /74   Pulse 80   Resp 12   Ht 1.626 m (5' 4\")   Wt 83.5 kg (184 lb)   SpO2 97%   BMI 31.58 kg/m²    Physical Exam  Constitutional:       General: She is not in acute distress.     Appearance: Normal appearance.     Cardiovascular:      Rate and Rhythm: Normal rate and regular rhythm.      Heart sounds: Normal heart sounds. No murmur heard.  Pulmonary:      Effort: Pulmonary effort is normal.      Breath sounds: Normal breath sounds. "   Abdominal:      Palpations: Abdomen is soft.      Tenderness: There is no abdominal tenderness.     Neurological:      Mental Status: She is alert.     Psychiatric:         Mood and Affect: Mood normal.         Judgment: Judgment normal.           Assessment/Plan   Diagnoses and all orders for this visit:  Benign hypertension - stable, continue current dose on Amlodipine  GERD without esophagitis - controlled with Omeprazole, continue daily  OAB (overactive bladder) -stable with Vesicare  Weight - recommend low carb diet, increasing water intake to at least 64oz/day, healthy snacking between meals, and regular cardiovascular exercise 150mins/week. Goal for weight loss is 1-2# per week.     Follow up in 6 months, 30mins for physical. Keep plan for DEXA and Mammogram.        To get better and follow your care plan as instructed.

## 2025-07-22 NOTE — PROGRESS NOTES
"Subjective   Patient ID: Keri Tolliver is a 69 y.o. female who presents for Follow-up.    HPI     Review of Systems    Objective   /74   Pulse 80   Resp 12   Ht 1.626 m (5' 4\")   Wt 83.5 kg (184 lb)   SpO2 97%   BMI 31.58 kg/m²     Physical Exam    Assessment/Plan          "

## 2025-08-06 DIAGNOSIS — Z12.31 ENCOUNTER FOR SCREENING MAMMOGRAM FOR BREAST CANCER: ICD-10-CM

## 2025-08-28 ENCOUNTER — APPOINTMENT (OUTPATIENT)
Dept: RADIOLOGY | Facility: CLINIC | Age: 70
End: 2025-08-28
Payer: MEDICARE

## 2025-08-28 DIAGNOSIS — Z12.31 ENCOUNTER FOR SCREENING MAMMOGRAM FOR BREAST CANCER: ICD-10-CM

## 2025-08-28 PROCEDURE — 77067 SCR MAMMO BI INCL CAD: CPT

## 2025-08-28 PROCEDURE — 77063 BREAST TOMOSYNTHESIS BI: CPT | Performed by: RADIOLOGY

## 2025-08-28 PROCEDURE — 77067 SCR MAMMO BI INCL CAD: CPT | Performed by: RADIOLOGY

## 2025-08-29 ENCOUNTER — RESULTS FOLLOW-UP (OUTPATIENT)
Dept: PRIMARY CARE | Facility: CLINIC | Age: 70
End: 2025-08-29
Payer: MEDICARE

## 2025-08-29 DIAGNOSIS — N63.20 MASS OF LEFT BREAST, UNSPECIFIED QUADRANT: Primary | ICD-10-CM

## 2025-09-02 ENCOUNTER — HOSPITAL ENCOUNTER (OUTPATIENT)
Dept: RADIOLOGY | Facility: CLINIC | Age: 70
Discharge: HOME | End: 2025-09-02
Payer: MEDICARE

## 2025-09-02 DIAGNOSIS — N63.20 MASS OF LEFT BREAST, UNSPECIFIED QUADRANT: ICD-10-CM

## 2025-09-02 PROCEDURE — 76642 ULTRASOUND BREAST LIMITED: CPT | Mod: LEFT SIDE | Performed by: RADIOLOGY

## 2025-09-02 PROCEDURE — 76642 ULTRASOUND BREAST LIMITED: CPT | Mod: LT

## 2025-09-02 PROCEDURE — 76982 USE 1ST TARGET LESION: CPT

## 2025-09-04 ENCOUNTER — OFFICE VISIT (OUTPATIENT)
Dept: HEMATOLOGY/ONCOLOGY | Facility: CLINIC | Age: 70
End: 2025-09-04
Payer: MEDICARE

## 2025-09-04 VITALS
SYSTOLIC BLOOD PRESSURE: 125 MMHG | WEIGHT: 187.39 LBS | OXYGEN SATURATION: 97 % | DIASTOLIC BLOOD PRESSURE: 80 MMHG | HEART RATE: 84 BPM | TEMPERATURE: 97.7 F | RESPIRATION RATE: 18 BRPM | BODY MASS INDEX: 32.17 KG/M2

## 2025-09-04 DIAGNOSIS — N63.21 MASS OF UPPER OUTER QUADRANT OF LEFT BREAST: ICD-10-CM

## 2025-09-04 DIAGNOSIS — Z01.818 PREPROCEDURAL EXAMINATION: Primary | ICD-10-CM

## 2025-09-04 PROCEDURE — 1160F RVW MEDS BY RX/DR IN RCRD: CPT | Performed by: NURSE PRACTITIONER

## 2025-09-04 PROCEDURE — 3074F SYST BP LT 130 MM HG: CPT | Performed by: NURSE PRACTITIONER

## 2025-09-04 PROCEDURE — 1036F TOBACCO NON-USER: CPT | Performed by: NURSE PRACTITIONER

## 2025-09-04 PROCEDURE — 1126F AMNT PAIN NOTED NONE PRSNT: CPT | Performed by: NURSE PRACTITIONER

## 2025-09-04 PROCEDURE — 99213 OFFICE O/P EST LOW 20 MIN: CPT | Performed by: NURSE PRACTITIONER

## 2025-09-04 PROCEDURE — 99203 OFFICE O/P NEW LOW 30 MIN: CPT | Performed by: NURSE PRACTITIONER

## 2025-09-04 PROCEDURE — 1159F MED LIST DOCD IN RCRD: CPT | Performed by: NURSE PRACTITIONER

## 2025-09-04 PROCEDURE — 3079F DIAST BP 80-89 MM HG: CPT | Performed by: NURSE PRACTITIONER

## 2025-09-04 SDOH — ECONOMIC STABILITY: GENERAL
WHICH OF THE FOLLOWING DO YOU KNOW HOW TO USE AND HAVE ACCESS TO EVERY DAY? (CHOOSE ALL THAT APPLY): DESKTOP COMPUTER, LAPTOP COMPUTER, OR TABLET WITH BROADBAND INTERNET CONNECTION;SMARTPHONE WITH CELLULAR DATA PLAN

## 2025-09-04 SDOH — ECONOMIC STABILITY: GENERAL
WHICH OF THE FOLLOWING WOULD YOU LIKE TO GET CONNECTED TO IN ORDER TO RECEIVE A DISCOUNT OR FOR FREE? (CHOOSE ALL THAT APPLY): COMPUTER;BROADBAND INTERNET SUBSCRIPTION;DIGITAL/INTERNET SKILLS TRAINING

## 2025-09-04 ASSESSMENT — COLUMBIA-SUICIDE SEVERITY RATING SCALE - C-SSRS
2. HAVE YOU ACTUALLY HAD ANY THOUGHTS OF KILLING YOURSELF?: NO
6. HAVE YOU EVER DONE ANYTHING, STARTED TO DO ANYTHING, OR PREPARED TO DO ANYTHING TO END YOUR LIFE?: NO
1. IN THE PAST MONTH, HAVE YOU WISHED YOU WERE DEAD OR WISHED YOU COULD GO TO SLEEP AND NOT WAKE UP?: NO

## 2025-09-04 ASSESSMENT — PAIN SCALES - GENERAL: PAINLEVEL_OUTOF10: 0-NO PAIN

## 2026-01-27 ENCOUNTER — APPOINTMENT (OUTPATIENT)
Dept: PRIMARY CARE | Facility: CLINIC | Age: 71
End: 2026-01-27
Payer: MEDICARE